# Patient Record
Sex: FEMALE | Race: WHITE | Employment: FULL TIME | URBAN - METROPOLITAN AREA
[De-identification: names, ages, dates, MRNs, and addresses within clinical notes are randomized per-mention and may not be internally consistent; named-entity substitution may affect disease eponyms.]

---

## 2020-11-10 ENCOUNTER — OFFICE VISIT (OUTPATIENT)
Dept: RHEUMATOLOGY | Age: 25
End: 2020-11-10
Payer: COMMERCIAL

## 2020-11-10 VITALS
BODY MASS INDEX: 24.07 KG/M2 | HEIGHT: 64 IN | TEMPERATURE: 98 F | DIASTOLIC BLOOD PRESSURE: 80 MMHG | HEART RATE: 96 BPM | SYSTOLIC BLOOD PRESSURE: 123 MMHG | OXYGEN SATURATION: 97 % | RESPIRATION RATE: 16 BRPM | WEIGHT: 141 LBS

## 2020-11-10 DIAGNOSIS — Z79.899 ONGOING USE OF POSSIBLY TOXIC MEDICATION: ICD-10-CM

## 2020-11-10 DIAGNOSIS — L94.0 MORPHEA: Primary | ICD-10-CM

## 2020-11-10 PROCEDURE — 99205 OFFICE O/P NEW HI 60 MIN: CPT | Performed by: INTERNAL MEDICINE

## 2020-11-10 RX ORDER — HYDROXYCHLOROQUINE SULFATE 200 MG/1
400 TABLET, FILM COATED ORAL DAILY
Qty: 180 TAB | Refills: 1 | Status: SHIPPED | OUTPATIENT
Start: 2020-11-10 | End: 2021-06-01 | Stop reason: SDUPTHER

## 2020-11-10 RX ORDER — CLOBETASOL PROPIONATE 0.5 MG/G
CREAM TOPICAL 2 TIMES DAILY
Qty: 60 G | Refills: 1 | Status: SHIPPED | OUTPATIENT
Start: 2020-11-10 | End: 2022-03-28

## 2020-11-10 NOTE — PROGRESS NOTES
REASON FOR VISIT:    is a 30 y.o. female with history of morphea who is self-referred to [address] [place] [address] Rheumatology to reestablish for the same. HISTORY OF PRESENT ILLNESS    Diagnosed within 10th grade with scleroderma, after punch biopsies of trunk lesions by two dermatologists. Referred to Rheumatology who diagnosed localized scleroderma (morphea). Treated with IV methylprednisolone infusions monthly as well as weekly subQ methotrexate. Estimates she has been on subQ methotrexate for 6 of the last 12 years. Last treatment June 2017. Was extremely nauseous with methotrexate. Switched to mycophenolate (1000mg bid) which she tolerated better with only minor sleep disturbance. Took this for 7-8 months, stopped around mid-2018. After about a year and half off of treatment, then in May started noticing new lesions, now about the size of a tennis ball from the size of a fingernail initially. Hasn't ever treated with topical treatments. 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 No change in voice since the diagnosis. No genesis GERD symptoms. No globus sensation. Feels she bloats easily after eating. No genesis constipation. Never bloody stool. Mother has been given multiple autoimmune diagnoses including Crohn's disease, ankylosing spondylitis, and rheumatoid arthritis. 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 Hands and feet always feel cold in the winter, though don't appreciably change color in the cold; does get painful flushing and erythema with hot water or rewarming. While doing gymnastics she felt she would get disproportionately dyspneic. Went to pulmonologist and completed exercise testing, doesn't recall the results but no medications started for this. No chronic cough. She gets pain from low back down. Hips. Feels deep constant ache throughout legs. No extended morning stiffness. \"Feels like a deep muscle ache. \"    Feels sun sensitive, gets \"splotchy\" while she is in the sun, fades over an hour or two. When younger would look more like hives. REVIEW OF SYSTEMS  Negatives as follows:  Regla Chaparroving:    Denies unexplained persistent fevers, weight change, chronic fatigue  HEAD/EYES:              Denies eye redness, blurry vision or sudden loss of vision, dry eyes, HA, temporal pain  ENT:                            Denies oral/nasal ulcers, recurrent sinus infections, dry mouth  RESPIRATORY:         No pleuritic pain, history of pleural effusions, hemoptysis, exertional dyspnea  CARDIOVASCULAR:             Denies chest pain, history of pericardial effusions  GASTRO:                    Denies heartburn, esophageal dysmotility, abdominal pain, nausea, vomiting, diarrhea, blood in the stool  HEMATOLOGIC:        No easy bruising, purpura, swollen lymph nodes  SKIN:                           Denies alopecia, ulcers, nodules, sun sensitivity, unexplained persistent rash   VASCULAR:                Denies edema, cyanosis, raynaud phenomenon  NEUROLOGIC:           Denies specific muscle weakness, paresthesias   PSYCHIATRIC:           No sleep disturbance / snoring, depression, anxiety  MSK:                           No morning stiffness >=1 hour, SI joint pain, persistent joint swelling, persistent joint pain    Review of systems is as above and is otherwise negative. ALLERGIES  Amoxicillin    MEDICATIONS  None current    PAST MEDICAL HISTORY  Significant only for morphea as per HPI    FAMILY HISTORY  family history includes Arthritis-rheumatoid in her mother; Crohn's Disease in her mother; Thyroid Disease in her mother. Mother has had iritis in the past. Younger brother has vitiligo. SOCIAL HISTORY  She  reports that she has never smoked. She has never used smokeless tobacco. She reports current alcohol use. She reports that she does not use drugs.   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 DATA  Visit Vitals  /80 (BP 1 Location: Left arm, BP Patient Position: Sitting)   Pulse 96   Temp 98 °F (36.7 °C) (Oral)   Resp 16   Ht 5' 4\" (1.626 m)   Wt 141 lb (64 kg)   LMP 10/27/2020   SpO2 97%   BMI 24.20 kg/m²    Body mass index is 24.2 kg/m². No flowsheet data found. General:  The patient is well developed, well nourished, alert, and in no apparent distress. Eyes: Sclera are anicteric. No conjunctival injection. HEENT:  Oropharynx is clear. No oral ulcers. Adequate salivary pooling. No cervical or supraclavicular lymphadenopathy. Lungs:  Clear to auscultation bilaterally, without wheeze or stridor. Normal respiratory effort. Cor:  Regular rate and rhythm. No murmur rub or gallop. Abdomen: Soft, non-tender, without hepatomegaly or masses. Extremities: No calf tenderness or edema. Warm and well perfused. Skin:  Numerous hyperpigmented postinflammatory lesions across abdomen and back particularly along lower bra line and lateral beltline, ~5cm diameter edematous patch under left breast, 1cm diameter more erythematous patch. No sclerodactyly. Normal nailfold capillaries. Neuro: No foot or wrist drop. Normal gait. Musculoskeletal:    A comprehensive musculoskeletal exam was performed for all joints of each upper and lower extremity and assessed for swelling, tenderness and range of motion. Results are documented as below:  No evidence of synovitis in the small joints of the hands, wrists, shoulders, elbows, hips, knees or ankles. Labs:  11/3/15: CBC WNL  12/8/08: Tbili 0.4, AST 22, ALT 14, AlkP 259. Imaging:  None available      ASSESSMENT AND PLAN  Ms. Eriberto Phipps is a 30 y.o. female who presents for evaluation of morphea.  She has had good earlier responses to methotrexate and then mycophenolate, but with localized breakthrough disease will first try topical clobetasol and background Plaquenil (hydroxychloroquine) to help suppress further flares. Reviewed risks of Plaquenil (hydroxychloroquine) and the need for annual ophthalmology exams. Reassured her of low suspicion for systemic sclerosis, though will update deep organ function and screen LOURDES. 1. Morphea  - IMMUNOGLOBULINS, G/A/M, QT.; Future  - ANTI-NUCLEAR AB BY IFA (RDL); Future  - C REACTIVE PROTEIN, QT; Future  - CBC WITH AUTOMATED DIFF; Future  - METABOLIC PANEL, COMPREHENSIVE; Future  - SED RATE (ESR); Future  - hydrOXYchloroQUINE (PLAQUENIL) 200 mg tablet; Take 2 Tabs by mouth daily. Dispense: 180 Tab; Refill: 1  - clobetasoL (TEMOVATE) 0.05 % topical cream; Apply  to affected area two (2) times a day. Dispense: 60 g; Refill: 1    2. Ongoing use of possibly toxic medication  - ANTI-NUCLEAR AB BY IFA (RDL); Future  - CBC WITH AUTOMATED DIFF; Future  - hydrOXYchloroQUINE (PLAQUENIL) 200 mg tablet; Take 2 Tabs by mouth daily. Dispense: 180 Tab; Refill: 1      Orders Placed This Encounter    IMMUNOGLOBULINS, G/A/M, QT.    ANTI-NUCLEAR AB BY IFA (RDL)    C REACTIVE PROTEIN, QT    CBC WITH AUTOMATED DIFF    METABOLIC PANEL, COMPREHENSIVE    SED RATE (ESR)    hydrOXYchloroQUINE (PLAQUENIL) 200 mg tablet    clobetasoL (TEMOVATE) 0.05 % topical cream     Patient Instructions   1. Your skin lesions are typical for morphea. I don't see any evidence of systemic sclerosis, though we'll screen for blood work predisposing you to problems. 2. Start taking Plaquenil one pill twice daily. Find an ophthalmologist to perform annual eye screenings for retinal toxicity monitoring. 3. I'm prescribing clobetasol cream to apply to the newer active lesions. Apply clobetasol twice a day. If you don't see any substantial improvement within a month, let me know so we don't leave you on this indefinitely. 4. Talk to someone in your office to help you manage topical treatments--dermatologists often do injections for this.     5. Return in 6 months, or message with any issues in the meantime. . Mychart's great, you can attach pictures to messages through this too which is helpful. Medications: I am having Alvaro Meraz start on hydrOXYchloroQUINE and clobetasoL. Follow up: 6 months, or sooner as needed for breakthrough disease    60- minutes were spent in-person this visit, of which greater than 50% of the visit was spent counseling the patient on diagnosis, differential, and management of morphea.     Agustina Sears MD    Adult Rheumatology   [facility]  [facility], [address] [facility]   Phone [phone]  Fax [phone]

## 2020-11-10 NOTE — PATIENT INSTRUCTIONS
1. Your skin lesions are typical for morphea. I don't see any evidence of systemic sclerosis, though we'll screen for blood work predisposing you to problems. 2. Start taking Plaquenil one pill twice daily. Find an ophthalmologist to perform annual eye screenings for retinal toxicity monitoring. 3. I'm prescribing clobetasol cream to apply to the newer active lesions. Apply clobetasol twice a day. If you don't see any substantial improvement within a month, let me know so we don't leave you on this indefinitely. 4. Talk to someone in your office to help you manage topical treatments--dermatologists often do injections for this. 5. Return in 6 months, or message with any issues in the meantime. . Mychart's great, you can attach pictures to messages through this too which is helpful.

## 2020-11-23 LAB
ALBUMIN SERPL-MCNC: 5 G/DL (ref 3.9–5)
ALBUMIN/GLOB SERPL: 2.3 {RATIO} (ref 1.2–2.2)
ALP SERPL-CCNC: 77 IU/L (ref 39–117)
ALT SERPL-CCNC: 12 IU/L (ref 0–32)
ANA HOMOGEN TITR SER: ABNORMAL {TITER}
ANA SER QL IF: POSITIVE
ANA SPECKLED TITR SER: ABNORMAL {TITER}
AST SERPL-CCNC: 19 IU/L (ref 0–40)
BASOPHILS # BLD AUTO: 0.1 X10E3/UL (ref 0–0.2)
BASOPHILS NFR BLD AUTO: 1 %
BILIRUB SERPL-MCNC: 0.4 MG/DL (ref 0–1.2)
BUN SERPL-MCNC: 8 MG/DL (ref 6–20)
BUN/CREAT SERPL: 10 (ref 9–23)
CALCIUM SERPL-MCNC: 10.1 MG/DL (ref 8.7–10.2)
CHLORIDE SERPL-SCNC: 101 MMOL/L (ref 96–106)
CO2 SERPL-SCNC: 24 MMOL/L (ref 20–29)
CREAT SERPL-MCNC: 0.82 MG/DL (ref 0.57–1)
CRP SERPL-MCNC: 2 MG/L (ref 0–10)
EOSINOPHIL # BLD AUTO: 0.1 X10E3/UL (ref 0–0.4)
EOSINOPHIL NFR BLD AUTO: 1 %
ERYTHROCYTE [DISTWIDTH] IN BLOOD BY AUTOMATED COUNT: 12 % (ref 11.7–15.4)
ERYTHROCYTE [SEDIMENTATION RATE] IN BLOOD BY WESTERGREN METHOD: 9 MM/HR (ref 0–32)
GLOBULIN SER CALC-MCNC: 2.2 G/DL (ref 1.5–4.5)
GLUCOSE SERPL-MCNC: 75 MG/DL (ref 65–99)
HCT VFR BLD AUTO: 37.4 % (ref 34–46.6)
HGB BLD-MCNC: 13 G/DL (ref 11.1–15.9)
IGA SERPL-MCNC: 204 MG/DL (ref 87–352)
IGG SERPL-MCNC: 1143 MG/DL (ref 586–1602)
IGM SERPL-MCNC: 88 MG/DL (ref 26–217)
IMM GRANULOCYTES # BLD AUTO: 0 X10E3/UL (ref 0–0.1)
IMM GRANULOCYTES NFR BLD AUTO: 0 %
LYMPHOCYTES # BLD AUTO: 2.8 X10E3/UL (ref 0.7–3.1)
LYMPHOCYTES NFR BLD AUTO: 31 %
MCH RBC QN AUTO: 31.1 PG (ref 26.6–33)
MCHC RBC AUTO-ENTMCNC: 34.8 G/DL (ref 31.5–35.7)
MCV RBC AUTO: 90 FL (ref 79–97)
MONOCYTES # BLD AUTO: 0.6 X10E3/UL (ref 0.1–0.9)
MONOCYTES NFR BLD AUTO: 7 %
NEUTROPHILS # BLD AUTO: 5.3 X10E3/UL (ref 1.4–7)
NEUTROPHILS NFR BLD AUTO: 60 %
NOTE, 018286: ABNORMAL
PLATELET # BLD AUTO: 305 X10E3/UL (ref 150–450)
POTASSIUM SERPL-SCNC: 4 MMOL/L (ref 3.5–5.2)
PROT SERPL-MCNC: 7.2 G/DL (ref 6–8.5)
RBC # BLD AUTO: 4.18 X10E6/UL (ref 3.77–5.28)
SODIUM SERPL-SCNC: 140 MMOL/L (ref 134–144)
WBC # BLD AUTO: 8.8 X10E3/UL (ref 3.4–10.8)

## 2021-05-03 LAB
ANTIBODY SCREEN, EXTERNAL: NEGATIVE
HBSAG, EXTERNAL: NEGATIVE
HEPATITIS C AB,   EXT: NEGATIVE
HIV, EXTERNAL: NON REACTIVE
RUBELLA, EXTERNAL: NORMAL
T. PALLIDUM, EXTERNAL: NON REACTIVE
TYPE, ABO & RH, EXTERNAL: NORMAL

## 2021-06-01 ENCOUNTER — OFFICE VISIT (OUTPATIENT)
Dept: RHEUMATOLOGY | Age: 26
End: 2021-06-01
Payer: COMMERCIAL

## 2021-06-01 VITALS
WEIGHT: 136.6 LBS | RESPIRATION RATE: 18 BRPM | HEART RATE: 100 BPM | BODY MASS INDEX: 23.32 KG/M2 | SYSTOLIC BLOOD PRESSURE: 115 MMHG | DIASTOLIC BLOOD PRESSURE: 73 MMHG | TEMPERATURE: 98.8 F | HEIGHT: 64 IN | OXYGEN SATURATION: 98 %

## 2021-06-01 DIAGNOSIS — L94.0 MORPHEA: Primary | ICD-10-CM

## 2021-06-01 DIAGNOSIS — Z79.899 ONGOING USE OF POSSIBLY TOXIC MEDICATION: ICD-10-CM

## 2021-06-01 PROCEDURE — 99215 OFFICE O/P EST HI 40 MIN: CPT | Performed by: INTERNAL MEDICINE

## 2021-06-01 RX ORDER — HYDROXYCHLOROQUINE SULFATE 200 MG/1
400 TABLET, FILM COATED ORAL DAILY
Qty: 180 TABLET | Refills: 3 | Status: SHIPPED | OUTPATIENT
Start: 2021-06-01 | End: 2022-03-24 | Stop reason: SDUPTHER

## 2021-06-01 RX ORDER — GUAIFENESIN 100 MG/5ML
81 LIQUID (ML) ORAL DAILY
COMMUNITY
End: 2022-03-28

## 2021-06-01 RX ORDER — ONDANSETRON 4 MG/1
4 TABLET, FILM COATED ORAL
COMMUNITY

## 2021-06-01 NOTE — PATIENT INSTRUCTIONS
1. Labs at your convenience in the next couple of weeks. 2. If your lesions get worse let me know, we may be able to use triamcinolone or a weaker steroid. 3. Continue Plaquenil (hydroxychloroquine). 4. Return in 6-9 months.

## 2021-06-01 NOTE — PROGRESS NOTES
Chief Complaint   Patient presents with    Follow-up     1. Have you been to the ER, urgent care clinic since your last visit? Hospitalized since your last visit? No    2. Have you seen or consulted any other health care providers outside of the [address] [place] [address] since your last visit? Include any pap smears or colon screening.  Yes Where: OB/[facility]N at [facility]

## 2021-06-01 NOTE — PROGRESS NOTES
REASON FOR VISIT:    is a 30 y.o. female with history of morphea who is returning for initial followup. HISTORY OF PRESENT ILLNESS    Tolerating Plaquenil (hydroxychloroquine) well, as long as she eats before she takes it. Consolidated now to 400mg once daily. Two new small lesions, left abdominal lesion slightly larger, periumbilical is similar. Saw ophthalmology for VF baseline screen which was normal.    No breathing or digestive complaints. Pregnant now, due December 7 (13wk now), saw ob/gyn last Friday. Advised to stop using clobetasol cream. Advised to start aspirin 81mg daily, though says she has never had a miscarriage. Taking zofran about twice a day for the last 8 weeks for morning sickness. Disease History:  Diagnosed in 10th grade with scleroderma, after punch biopsies of trunk lesions by two dermatologists. Referred to Rheumatology who diagnosed localized scleroderma (morphea). Treated with IV methylprednisolone infusions monthly as well as weekly subQ methotrexate. Estimates she has been on subQ methotrexate for 6 of the last 12 years. Last treatment June 2017. Was extremely nauseous with methotrexate. Switched to mycophenolate (1000mg bid) which she tolerated better with only minor sleep disturbance. Took this for 7-8 months, stopped around mid-2018. After about a year and half off of treatment, then in May started noticing new lesions, now about the size of a tennis ball from the size of a fingernail initially. Hasn't ever treated with topical treatments. 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 No change in voice since the diagnosis. No genesis GERD symptoms. No globus sensation. Feels she bloats easily after eating. No genesis constipation. Never bloody stool. Mother has been given multiple autoimmune diagnoses including Crohn's disease, ankylosing spondylitis, and rheumatoid arthritis.  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 Hands and feet always feel cold in the winter, though don't appreciably change color in the cold; does get painful flushing and erythema with hot water or rewarming. While doing gymnastics she felt she would get disproportionately dyspneic. Went to pulmonologist and completed exercise testing, doesn't recall the results but no medications started for this. No chronic cough. She gets pain from low back down. Hips. Feels deep constant ache throughout legs. No extended morning stiffness. \"Feels like a deep muscle ache. \"    Feels sun sensitive, gets \"splotchy\" while she is in the sun, fades over an hour or two. When younger would look more like hives.       REVIEW OF SYSTEMS  Negatives as follows:  Jacey Sesay:    Denies unexplained persistent fevers, weight change, chronic fatigue  HEAD/EYES:              Denies eye redness, blurry vision or sudden loss of vision, dry eyes, HA, temporal pain  ENT:                            Denies oral/nasal ulcers, recurrent sinus infections, dry mouth  RESPIRATORY:         No pleuritic pain, history of pleural effusions, hemoptysis, exertional dyspnea  CARDIOVASCULAR:             Denies chest pain, history of pericardial effusions  GASTRO:                    Denies heartburn, esophageal dysmotility, abdominal pain, nausea, vomiting, diarrhea, blood in the stool  HEMATOLOGIC:        No easy bruising, purpura, swollen lymph nodes  SKIN:                           Denies alopecia, ulcers, nodules, sun sensitivity, unexplained persistent rash   VASCULAR:                Denies edema, cyanosis, raynaud phenomenon  NEUROLOGIC:           Denies specific muscle weakness, paresthesias   PSYCHIATRIC:           No sleep disturbance / snoring, depression, anxiety  MSK:                           No morning stiffness >=1 hour, SI joint pain, persistent joint swelling, persistent joint pain    Review of systems is as above and is otherwise negative. ALLERGIES  Amoxicillin    MEDICATIONS  None current    PAST MEDICAL HISTORY  Significant only for morphea as per HPI    FAMILY HISTORY  family history includes Arthritis-rheumatoid in her mother; Crohn's Disease in her mother; Thyroid Disease in her mother. Mother has had iritis in the past. Younger brother has vitiligo. SOCIAL HISTORY  She  reports that she has never smoked. She has never used smokeless tobacco. She reports current alcohol use. She reports that she does not use drugs. 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 No flowsheet data found. General:  The patient is well developed, well nourished, alert, and in no apparent distress. Eyes: Sclera are anicteric. No conjunctival injection. HEENT:  Oropharynx is clear. No oral ulcers. Adequate salivary pooling. No cervical or supraclavicular lymphadenopathy. Lungs:  Clear to auscultation bilaterally, without wheeze or stridor. Normal respiratory effort. Cor:  Regular rate and rhythm. No murmur rub or gallop. Abdomen: Soft, non-tender, without hepatomegaly or masses. Extremities: No calf tenderness or edema. Warm and well perfused. Skin:  Numerous stable hyperpigmented postinflammatory lesions across abdomen and back particularly along lower bra line and lateral beltline, Grossly stable ~5cm diameter edematous patch under left breast, 1cm diameter more erythematous patch under right breast. Still no sclerodactyly. Normal nailfold capillaries. Neuro: No foot or wrist drop. Normal gait.   Musculoskeletal:    A comprehensive musculoskeletal exam was performed for all joints of each upper and lower extremity and assessed for swelling, tenderness and range of motion. Results are documented as below:  No evidence of synovitis in the small joints of the hands, wrists, shoulders, elbows, hips, knees or ankles. Labs:  11/20/20: CBC WNL, CMP WNL (Cr 0.82), LOURDES 1:160 homogenous, ESR 9, CRP 2mg/L  11/3/15: CBC WNL  12/8/08: Tbili 0.4, AST 22, ALT 14, AlkP 259. Imaging:  None available      ASSESSMENT AND PLAN  Ms. Vivien Kay is a 30 y.o. female who presents for evaluation of morphea. She has had good earlier responses to methotrexate and then mycophenolate, tolerating Plaquenil (hydroxychloroquine) well now entering the second trimester of pregnancy. Will continue to avoid clobetasol though could entertain triamcinolone cream if lesions worsen, with blessing of her ob/gyn. Low-titer LOURDES of doubtful clinical significance, sending SSA/SSB per ob/gyn and will complete scleroderma workup with scl70 and RNApol3 antibodies as well (though typically not homogenous pattern LOURDES). 1. Morphea  - SJOGREN'S ABS, SSA AND SSB; Future  - RNA POLYMERASE-III ANTIBODY, IGG; Future  - SCLERODERMA (SCL-70) AB, IGG; Future  - CBC WITH AUTOMATED DIFF; Future  - METABOLIC PANEL, COMPREHENSIVE; Future  - URINALYSIS W/MICROSCOPIC; Future  - PROT+CREATU (RANDOM); Future  - SJOGREN'S ABS, SSA AND SSB  - RNA POLYMERASE-III ANTIBODY, IGG  - SCLERODERMA (SCL-70) AB, IGG  - CBC WITH AUTOMATED DIFF  - METABOLIC PANEL, COMPREHENSIVE  - URINALYSIS W/MICROSCOPIC  - PROT+CREATU (RANDOM)  - hydrOXYchloroQUINE (PLAQUENIL) 200 mg tablet; Take 2 Tablets by mouth daily. Dispense: 180 Tablet; Refill: 3    2. Ongoing use of possibly toxic medication  - hydrOXYchloroQUINE (PLAQUENIL) 200 mg tablet; Take 2 Tablets by mouth daily. Dispense: 180 Tablet;  Refill: 3        Orders Placed This Encounter    SJOGREN'S ABS, SSA AND SSB    RNA POLYMERASE-III ANTIBODY, IGG    SCLERODERMA (SCL-70) AB, IGG    CBC WITH AUTOMATED DIFF    METABOLIC PANEL, COMPREHENSIVE    URINALYSIS W/MICROSCOPIC    PROT+CREATU (RANDOM)    aspirin 81 mg chewable tablet    vit B Cmplx 3-FA-Vit C-Biotin (NEPHRO YADIRA RX) 1- mg-mg-mcg tablet    ondansetron hcl (Zofran) 4 mg tablet    prenatal 36/UZHZ fum/folic/dha (PRENATAL-1 PO)    hydrOXYchloroQUINE (PLAQUENIL) 200 mg tablet     Patient Instructions   1. Labs at your convenience in the next couple of weeks. 2. If your lesions get worse let me know, we may be able to use triamcinolone or a weaker steroid. 3. Continue Plaquenil (hydroxychloroquine). 4. Return in 6-9 months. Medications: I have changed Radha Meraz's hydrOXYchloroQUINE. I am also having her maintain her clobetasoL, aspirin, vit B Cmplx 3-FA-Vit C-Biotin, ondansetron hcl, and prenatal 88/NWFP fum/folic/dha (PRENATAL-1 PO).     Follow up: 6-9 months, or sooner as needed for breakthrough disease    Face to face time: 20 minutes  Note preparation and records review day of service: 20 minutes  Total provider time day of service: 40 minutes      Tara Rodríguez MD    Adult Rheumatology   [address] [facility] [address]  [place], [address] [place] [address]   Phone [phone]  Fax [phone]

## 2021-06-01 NOTE — LETTER
6/1/2021 9:14 AM    Patient:  Chuy Larry   YOB: 1995  Date of Visit: 6/1/2021      Dear Rosy Aldrich MD   Kecia Fatima [address] [place] [id number]  Via Fax: [phone]: Thank you for referring Ms. Chuy Larry to me for evaluation/treatment. Below are the relevant portions of my assessment and plan of care. If you have questions, please do not hesitate to call me. I look forward to following Ms. Meraz along with you.         Sincerely,      Charito Mcfarlane MD  Cell: [phone]

## 2021-06-09 LAB
ALBUMIN SERPL-MCNC: 4.1 G/DL (ref 3.9–5)
ALBUMIN/GLOB SERPL: 1.5 {RATIO} (ref 1.2–2.2)
ALP SERPL-CCNC: 71 IU/L (ref 48–121)
ALT SERPL-CCNC: 12 IU/L (ref 0–32)
ANTI-RNA POLYMERASE III (RDL): <20 UNITS
APPEARANCE UR: CLEAR
AST SERPL-CCNC: 17 IU/L (ref 0–40)
BACTERIA #/AREA URNS HPF: NORMAL /[HPF]
BASOPHILS # BLD AUTO: 0 X10E3/UL (ref 0–0.2)
BASOPHILS NFR BLD AUTO: 0 %
BILIRUB SERPL-MCNC: 0.2 MG/DL (ref 0–1.2)
BILIRUB UR QL STRIP: NEGATIVE
BUN SERPL-MCNC: 6 MG/DL (ref 6–20)
BUN/CREAT SERPL: 13 (ref 9–23)
CALCIUM SERPL-MCNC: 9.5 MG/DL (ref 8.7–10.2)
CASTS URNS QL MICRO: NORMAL /LPF
CHLORIDE SERPL-SCNC: 99 MMOL/L (ref 96–106)
CO2 SERPL-SCNC: 21 MMOL/L (ref 20–29)
COLOR UR: YELLOW
CREAT SERPL-MCNC: 0.48 MG/DL (ref 0.57–1)
CREAT UR-MCNC: 21.3 MG/DL
ENA SCL70 AB SER-ACNC: <0.2 AI (ref 0–0.9)
ENA SS-A AB SER-ACNC: <0.2 AI (ref 0–0.9)
ENA SS-B AB SER-ACNC: <0.2 AI (ref 0–0.9)
EOSINOPHIL # BLD AUTO: 0 X10E3/UL (ref 0–0.4)
EOSINOPHIL NFR BLD AUTO: 0 %
EPI CELLS #/AREA URNS HPF: NORMAL /HPF (ref 0–10)
ERYTHROCYTE [DISTWIDTH] IN BLOOD BY AUTOMATED COUNT: 12.4 % (ref 11.7–15.4)
GLOBULIN SER CALC-MCNC: 2.7 G/DL (ref 1.5–4.5)
GLUCOSE SERPL-MCNC: 95 MG/DL (ref 65–99)
GLUCOSE UR QL: NEGATIVE
HCT VFR BLD AUTO: 36.5 % (ref 34–46.6)
HGB BLD-MCNC: 12.7 G/DL (ref 11.1–15.9)
HGB UR QL STRIP: NEGATIVE
IMM GRANULOCYTES # BLD AUTO: 0.1 X10E3/UL (ref 0–0.1)
IMM GRANULOCYTES NFR BLD AUTO: 1 %
KETONES UR QL STRIP: NEGATIVE
LEUKOCYTE ESTERASE UR QL STRIP: NEGATIVE
LYMPHOCYTES # BLD AUTO: 1.9 X10E3/UL (ref 0.7–3.1)
LYMPHOCYTES NFR BLD AUTO: 20 %
MCH RBC QN AUTO: 30.9 PG (ref 26.6–33)
MCHC RBC AUTO-ENTMCNC: 34.8 G/DL (ref 31.5–35.7)
MCV RBC AUTO: 89 FL (ref 79–97)
MICRO URNS: NORMAL
MICRO URNS: NORMAL
MONOCYTES # BLD AUTO: 0.7 X10E3/UL (ref 0.1–0.9)
MONOCYTES NFR BLD AUTO: 8 %
NEUTROPHILS # BLD AUTO: 6.7 X10E3/UL (ref 1.4–7)
NEUTROPHILS NFR BLD AUTO: 71 %
NITRITE UR QL STRIP: NEGATIVE
PH UR STRIP: 6.5 [PH] (ref 5–7.5)
PLATELET # BLD AUTO: 277 X10E3/UL (ref 150–450)
POTASSIUM SERPL-SCNC: 4.2 MMOL/L (ref 3.5–5.2)
PROT SERPL-MCNC: 6.8 G/DL (ref 6–8.5)
PROT UR QL STRIP: NEGATIVE
PROT UR-MCNC: <4 MG/DL
PROT/CREAT UR: ABNORMAL MG/G CREAT (ref 0–200)
RBC # BLD AUTO: 4.11 X10E6/UL (ref 3.77–5.28)
RBC #/AREA URNS HPF: NORMAL /HPF (ref 0–2)
SODIUM SERPL-SCNC: 136 MMOL/L (ref 134–144)
SP GR UR: 1.01 (ref 1–1.03)
UROBILINOGEN UR STRIP-MCNC: 0.2 MG/DL (ref 0.2–1)
WBC # BLD AUTO: 9.5 X10E3/UL (ref 3.4–10.8)
WBC #/AREA URNS HPF: NORMAL /HPF (ref 0–5)

## 2021-06-29 ENCOUNTER — TELEPHONE (OUTPATIENT)
Dept: RHEUMATOLOGY | Age: 26
End: 2021-06-29

## 2021-06-29 NOTE — TELEPHONE ENCOUNTER
----- Message from Ed Rollins sent at 6/29/2021  2:51 PM EDT -----  Regarding: Dr. Kyler Mg Message/Vendor Calls    Caller's first and last name:Laurence([facility]-ER)      Reason for call:lab results      Callback required yes/no and why:yes, if questions      Best contact number(s):[phone](fax)[phone]      Details to clarify the request:Laurence requested pts \"SSA and \"SSB\" lab results.       Ed Rollins

## 2021-06-29 NOTE — TELEPHONE ENCOUNTER
Call placed to [facility] x 3 to speak with Pito Root to clarify what is needed. Phone call got disconnected with each call.

## 2021-10-05 ENCOUNTER — TELEPHONE (OUTPATIENT)
Dept: RHEUMATOLOGY | Age: 26
End: 2021-10-05

## 2021-10-05 NOTE — TELEPHONE ENCOUNTER
Call patient per doc request left voice message to have patient to call back into the office to schedule an appt for her RPV within the next 1-2 weeks.  sdh

## 2021-11-12 LAB — GRBS, EXTERNAL: NEGATIVE

## 2021-11-29 ENCOUNTER — HOSPITAL ENCOUNTER (INPATIENT)
Age: 26
LOS: 3 days | Discharge: HOME | End: 2021-12-02
Attending: OBSTETRICS & GYNECOLOGY | Admitting: OBSTETRICS & GYNECOLOGY
Payer: COMMERCIAL

## 2021-11-29 PROBLEM — Z3A.39 39 WEEKS GESTATION OF PREGNANCY: Status: ACTIVE | Noted: 2021-11-29

## 2021-11-29 LAB
COVID-19 RAPID TEST, COVR: NOT DETECTED
ERYTHROCYTE [DISTWIDTH] IN BLOOD BY AUTOMATED COUNT: 12.8 % (ref 11.5–14.5)
HCT VFR BLD AUTO: 35 % (ref 35–47)
HGB BLD-MCNC: 11.7 G/DL (ref 11.5–16)
MCH RBC QN AUTO: 30.5 PG (ref 26–34)
MCHC RBC AUTO-ENTMCNC: 33.4 G/DL (ref 30–36.5)
MCV RBC AUTO: 91.4 FL (ref 80–99)
NRBC # BLD: 0 K/UL (ref 0–0.01)
NRBC BLD-RTO: 0 PER 100 WBC
PLATELET # BLD AUTO: 220 K/UL (ref 150–400)
PMV BLD AUTO: 10.5 FL (ref 8.9–12.9)
RBC # BLD AUTO: 3.83 M/UL (ref 3.8–5.2)
SARS-COV-2, COV2: NORMAL
SOURCE, COVRS: NORMAL
WBC # BLD AUTO: 13.6 K/UL (ref 3.6–11)

## 2021-11-29 PROCEDURE — 85027 COMPLETE CBC AUTOMATED: CPT

## 2021-11-29 PROCEDURE — 87635 SARS-COV-2 COVID-19 AMP PRB: CPT

## 2021-11-29 PROCEDURE — 74011250636 HC RX REV CODE- 250/636: Performed by: OBSTETRICS & GYNECOLOGY

## 2021-11-29 PROCEDURE — 65270000029 HC RM PRIVATE

## 2021-11-29 PROCEDURE — 36415 COLL VENOUS BLD VENIPUNCTURE: CPT

## 2021-11-29 PROCEDURE — 74011250637 HC RX REV CODE- 250/637: Performed by: OBSTETRICS & GYNECOLOGY

## 2021-11-29 RX ORDER — SODIUM CHLORIDE, SODIUM LACTATE, POTASSIUM CHLORIDE, CALCIUM CHLORIDE 600; 310; 30; 20 MG/100ML; MG/100ML; MG/100ML; MG/100ML
125 INJECTION, SOLUTION INTRAVENOUS CONTINUOUS
Status: DISCONTINUED | OUTPATIENT
Start: 2021-11-29 | End: 2021-12-02 | Stop reason: HOSPADM

## 2021-11-29 RX ORDER — OXYTOCIN/RINGER'S LACTATE 30/500 ML
87.3 PLASTIC BAG, INJECTION (ML) INTRAVENOUS AS NEEDED
Status: COMPLETED | OUTPATIENT
Start: 2021-11-29 | End: 2021-11-30

## 2021-11-29 RX ORDER — OXYTOCIN/RINGER'S LACTATE 30/500 ML
0-20 PLASTIC BAG, INJECTION (ML) INTRAVENOUS
Status: DISCONTINUED | OUTPATIENT
Start: 2021-11-30 | End: 2021-11-30 | Stop reason: HOSPADM

## 2021-11-29 RX ORDER — HYDROXYCHLOROQUINE SULFATE 200 MG/1
400 TABLET, FILM COATED ORAL
Status: DISCONTINUED | OUTPATIENT
Start: 2021-11-29 | End: 2021-11-30

## 2021-11-29 RX ORDER — SODIUM CHLORIDE 0.9 % (FLUSH) 0.9 %
5-40 SYRINGE (ML) INJECTION EVERY 8 HOURS
Status: DISCONTINUED | OUTPATIENT
Start: 2021-11-29 | End: 2021-11-30 | Stop reason: HOSPADM

## 2021-11-29 RX ORDER — OXYTOCIN/RINGER'S LACTATE 30/500 ML
10 PLASTIC BAG, INJECTION (ML) INTRAVENOUS AS NEEDED
Status: DISCONTINUED | OUTPATIENT
Start: 2021-11-29 | End: 2021-12-02 | Stop reason: HOSPADM

## 2021-11-29 RX ORDER — HYDROXYCHLOROQUINE SULFATE 200 MG/1
400 TABLET, FILM COATED ORAL
Status: DISCONTINUED | OUTPATIENT
Start: 2021-11-30 | End: 2021-11-29

## 2021-11-29 RX ORDER — ONDANSETRON 2 MG/ML
4 INJECTION INTRAMUSCULAR; INTRAVENOUS
Status: DISCONTINUED | OUTPATIENT
Start: 2021-11-29 | End: 2021-11-30 | Stop reason: HOSPADM

## 2021-11-29 RX ORDER — TERBUTALINE SULFATE 1 MG/ML
0.25 INJECTION SUBCUTANEOUS AS NEEDED
Status: DISCONTINUED | OUTPATIENT
Start: 2021-11-29 | End: 2021-11-30 | Stop reason: HOSPADM

## 2021-11-29 RX ORDER — BUTORPHANOL TARTRATE 1 MG/ML
2 INJECTION INTRAMUSCULAR; INTRAVENOUS
Status: DISCONTINUED | OUTPATIENT
Start: 2021-11-29 | End: 2021-11-30 | Stop reason: HOSPADM

## 2021-11-29 RX ORDER — SODIUM CHLORIDE 0.9 % (FLUSH) 0.9 %
5-40 SYRINGE (ML) INJECTION AS NEEDED
Status: DISCONTINUED | OUTPATIENT
Start: 2021-11-29 | End: 2021-11-30 | Stop reason: HOSPADM

## 2021-11-29 RX ORDER — HYDROXYCHLOROQUINE SULFATE 200 MG/1
400 TABLET, FILM COATED ORAL DAILY
COMMUNITY
End: 2022-03-28

## 2021-11-29 RX ADMIN — HYDROXYCHLOROQUINE SULFATE 400 MG: 200 TABLET ORAL at 21:39

## 2021-11-29 RX ADMIN — BUTORPHANOL TARTRATE 2 MG: 1 INJECTION, SOLUTION INTRAMUSCULAR; INTRAVENOUS at 18:58

## 2021-11-29 NOTE — H&P
[facility]  Martell Alfredo 30, [address]  Phone: [phone], Fax: [phone]  Date: 2021    Pregnancy Problems:   Reduced fetal movement   Autoimmune disease - working diagnosis of lupus from peds rheum; but adult rheum says NO; does have scleroderma - rheumatologist: Dr Lucian Bush - on hydroxychloroquine (takes this for Scleroderma) 2021 - labs, reports most recent LOURDES neg, SSA/SSB neg baby ASA *growth q4w (w MFM), weekly surv at [facility] (no MFM, unless concerns arise)____  - EFW 62%ile 9/15 - w - EFW 43%ile Per MFM- [address] [facility] [address] w induction   Rubella non-immune - vaccine PP   Hyperemesis gravidarum   Primigravida   Family history of intellectual disability   Family history of Congenital heart disease - MGF- valve too small    Pt reports her mom had severe PP fistula and multiple reconstructions (mom has h/o crohns) --> pt concerned - wants to discuss birth plan  PRN faxed to L&D 11/15/2021 - VM  21-6am [facility] L&D/IOL/Sarraf (21 4pm cook catheter/[place])  History of Present Illness:  Pt is a 28 yo G1 at 38+6 presenting for induction for hx scleroderma and possibly lupus although her current rheumatologist does not feel that she meets criteria. Pregnancy also complicated by Rubella non-immune status. Pt is GBS negative. EFW 11/10 20th percentile. Assessment/Plan  1. Gestation period, 38 weeks -  Admit to L&D. Stephens bulb for cervical ripening. Pitocin in AM.  GBS neg. EFM/[place].  AROM PRN. Epidural PRN. Anticipate .  Z3A.38: 38 weeks gestation of pregnancy    2. Autoimmune disease -  Scleroderma --> on Plaquinil 400 mg daily  SSA/SSB neg. EFW 20%    M35.9: Systemic involvement of connective tissue, unspecified    3.  Rubella non-immune -  MMR PP  Z78.9: Other specified health status      Return to Office     for Return OB at [facility] [facility] on or around 2021   for Ultrasound Testing at [facility] [facility] on or around 11/30/2021  Ameya Wong MD for Surgery at [facility] () on 11/30/2021 at 06:00 AM  Prenatal Flowsheet:  Fundus Pres FHR FM PLS Cervix Exam BP Wt Edema Glucose Protein Leukocytes Nitrite Ketones Blood   05/03/2021   8 wks 6 days                           184    98/72 143 lbs none         Comments: Viable IUP. First visit. Will check insur coverage for SMA, CF Discussed prenatal labs, carrier testing options, aneuploidy testing options, and reviewed prenatal packet/folder/deck system/nutrition. Environmental/work hazards reviewed. 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 Considering COVID vaccine. Comfortable waiting for 2nd trimester. LUPUS hx. Reports pediatric dx. Also scleroderma. Takes hydroxychloroquine. Rx by rheumatology. No longer seeing pediatric rheum. Will f/u labs and reports they are confirming if indeed lupus dx. Reviewed that mgmnt of this pregnancy will be high risk for this supervision. Needs MFM w/ FS @ 20wk for r/o heart block. Consider baby ASA. Pt reports her mom had severe PP fistula and multiple reconstructions (mom does have h/o crohns) --> pt concerned - wants to discuss birth plan - chart noted Hyperemesisis - on zofran. Pap w/ G/C NEG 12/2020 05/03/2021     [email or web address]                                        05/28/2021   12 wks 3 days   [email or web address]                         164    100/66 140 lbs none         Comments: NOB. CAT is reviewed. Aneuploidy/NTD testing is discussed. Thinking [id number]. Undecided on hospital. Taking Zofran daily for nausea, helpful but still nausea/vomiting. Eating snacks throughout the day but reports poor diet/food aversions. Drinking Pedialyte and water. Few HAs this week. Has not taken meds. Some abdominal cramping/constipation. Denies abnormal discharge or vaginal bleeding. 06/24/2021   16 wks 2 days   [email or web address]                         143    110/70 138 lbs none none trace       Comments: Still not having great luck with meals.  States she is eating consistently more but is concerned she is still not gaining weight. Nausea has subsided for the last week, only vomiting on 2 occasions in the last 10 days. Denies HAs, cramping, abnormal discharge or vaginal bleeding. Pt would like to get covid vaccine second trimester. [id number] normal, GIRL! Planning covid vaccine. 07/21/2021   20 wks 1 days   [email or web address]                         155 Yes   114/68 144 lbs none none neg       Comments: U/S prior, FS w/ normal ECHO. U/S in pre jacobs status. Ant plac, EFW 26th%, Normal growth. No previa. Normal AFV. Nausea improved. No emesis. Reports acute gastric pain post prandial. Little help w/ tums. Improves over time. Rx pepcid daily, stay upright after eating. Denies h/a or edema or RUQ pain. If pain persists, needs RUQ imaging for r/o gallbladder. Pt comfortable w/ this plan. 2nd trim care reviewed. Classes discussed. Desires epidural. F/u in 4w as scheduled. Continue baby ASA d/t risk factors. 07/27/2021   21 wks   [email or web address]                       22 cm  153 Yes   110/68 145 lbs none         Comments: Doing well, active FM! Nausea has improved. C/o constant exhaustion- could start iron if desired, but also will check Hgb at 28w. Planning to get covid vaccine over the weekend. 08/02/2021   21 wks 6 days   [email or web address]                       22 cm  156 Yes  0cm / 0% / -4 112/70 145 lbs          Comments: Rm 10a. Pt complains of cramp-like abdominal cramping, hot and cold flashes. Denies bleeding, abnormal discharge, headaches, nausea. Normal BM. No gu sx , Not over active this weekend. Pt found out this morning that a coworker tested pos for COVID-- pt has not had COVID vaccine. Pt does not report any symptoms.  Strongly encouraged covid testing and if neg get vaccinated. sse: thicker white d/c wt prep few hyphae udip 2+ leuks will rx w/ macrobid and send ucx, prec reviewed, increase hydration/pelvic rest , keep scheduled appt   08/20/2021   24 wks 3 days   [email or web address] 155 Yes   116/76 150 lbs none         Comments: US 8/18 w MFM - EFW 62%ile, Having increased swelling of feet and ankles for the past 1-2 weeks. Compression socks are helping. Denies n/v, bleeding or abnormal discharge. Severe sciatic nerve pain - PT offered and accepted. (has known labral tear) Covid vaccine reviewed w patient. ACOG's new statement that recommends vaccination for pregnant women, and the safety data that has been gathered since vaccine introduction, is reviewed with patient and it is recommended that she get the vaccine for her safety. Questions are answered and she plans to get it next week. 09/20/2021   28 wks 6 days   [email or web address]                        Vertex 139 Yes   112/62 156 lbs trace none neg       Comments: Doing well. U/S done 9/15: EFW 43%ile, growth w MFM, weekly surv at 32 (no MFM unless concerns arise) Glucola in range. Hgb stable. TdAP today COVID #1 done. Denies reactions or s/e. ([place]) scheduled again in 2w. Reports pedal edema end of day. Better w/ rest. BP stable. Otherwise doing well. Reviewed strategies to help support low back discomfort 3rd trim care and precautions advised. Will choose peds MD. F/u in 2w. Scheduled for U/S in 4w.   09/28/2021   30 wks   [email or web address]                       28 cm Vertex 144 Yes   110/72 158 lbs trace         Comments: Pt presents at routine visit with c/o lightheadedness and nausea d/t leg pain in one small spot,inner left thight. . denies headaches, bleeding, abnormal discharge, LOF, cramping. Good FM. C/o excruciating inner thigh left leg pain that started 2 days ago. Pain does not radiate anywhere and only her left foot is swollen. Pain is 8/10 when walking. Pt has had sciatic nerve pain before, but reports this does not feel like that. Denies any strenuous changes in activity. Pt had imaging at [address] done yesterday (Monday 9/27/21) to rule out blood clot.  After consulting with Dr. Alison Amado, will refer to pt's rheumatologist for further work up. recommending warm heat, daily aspirin and lidocaine patch at this time to help with immediate pain symptoms. 10/15/2021   32 wks 3 days   [email or web address]                         144 Yes   110/68 160 lbs trace none trace       Comments: C/o acid reflux, increased pelvic pressure, and swelling in left foot and ankle over past 4 wks. reports she has been wearing compression socks at work. pt had swelling in left leg 2 wks ago, for which she had a doppler that was negative for a DVT. had a very focal area of pain on left thigh. Exam today w B symmetric trace edema, no erythema. Urgent care sent her to ortho, saw PA who said labral tear (which is known) is not the cause, advised crutches for a week and pain resolved. Foot still swelling at times, and feels bruised- rec follow up w ortho. pubic symphysis pain - feels like it is falling apart, declines PT referral. denies nausea/vomiting, vaginal bleeding, abnl discharge, headaches, vision changes, pelvic pain. 10/21/2021   33 wks 2 days   [email or web address]                         142 Yes   112/70 160 lbs trace         Comments: C/o pubic symphysis pain, swelling in feet, and acid reflux that is worse in the morning and in evenings. states she has reached out to friends who are in PT for exercise suggestions, pt denies any improvement in pubic symphysis pain yet. pt notes she cannot feasibly go to PT d/t time needed off work and cost at this time- plans in person visit if no response. reports foot soreness associated w swelling (not as severe as prior)- doppler neg. US after visit denies any pain in leg. denies nausea/vomiting, vaginal bleeding, abnl discharge, headaches, vision changes, LOF. 10/28/2021   34 wks 2 days   [email or web address]                         153 Yes   106/70 162 lbs          Comments: US prior BPP 8/8. no change in L foot swelling, resolves over night. Occ cramping/tightening last few days, constant, normal BMs- PTL precautions reviewed.    11/05/2021 35 wks 3 days   [email or web address]                          Yes   108/68 162 lbs trace         Comments: US after office visit. BPP 8/8 C/o pelvic pressure and pubic symphysis pain. notes PT exercises very mildly improve pubic pain. denies nausea/vomiting, vaginal bleeding, abnl discharge, headaches, vision changes, pelvic pain. 11/12/2021   36 wks 3 days   [email or web address]                        Vertex 146 Decreased  0cm / 30% / -3 106/74 163 lbs trace         Comments: GBS today. BPP 11/10 - 8/8, EFW 20%ile. NST today. decreased fetal movement yesterday. Notes she has not felt a significant amount of FM this morning yet. Reports she was nauseous, had hot flushes, and pelvic pressure that radiated to her back yesterday. notes leaning forward with her legs apart mildly provided some relief. denies vomiting, vaginal bleeding, abnl discharge, headaches, vision changes, pelvic pain, LOF. IOL recommendation reviewed by 39w.   11/17/2021   37 wks 1 days   [email or web address]                        Vertex 159 Yes  0cm / 0% / -3 100/68 162 lbs trace none neg       Comments: BPP prior 8/8 normal AFV. GBS neg. Complains of nausea and moderate cramping on Monday morning that woke her up, none since then. Great fetal movement. Denies Headaches, vomiting, Abnormal discharge, LOF, bleeding. Will schedule IOL at 39 weeks today. Cervix closed. STrict precautions reviewed. RTO x 1 week with BPP.   11/22/2021   37 wks 6 days   [email or web address]                       38 cm Vertex 138 Yes  1cm / 40% / -3 112/74 163 lbs trace none neg       Comments: BPP 8/8. VTX. Yesterday had episode of n/v/d. No fever. Called on-call MD. [facility]. Desires cervical check. Induction scheduled 11/30/2021. Coming in 11/29 for cx ripening. GBS NEG. Will d/c baby ASA this weekend. Denies HSV hx. Has not had COVID testing - handout provided Plans to breastfeed. Declines plans for [facility] at this time. 11/29/2021     [email or web address]                                        Allergies:   Allergies not reviewed (last reviewed 11/22/2021)     AMOXICILLIN: Hives (Moderate)    Medications:  Medications not reviewed (last reviewed 11/22/2021)     Baby Aspirin 81 mg chewable tablet  Chew 1 tablet(s) every day by oral route.  06/24/21   entered    hydrOXYchloroQUINE  400mg daily 05/03/21   entered    iron 08/20/21   entered    Prenatal 06/24/21   entered    Vital Signs:  None recorded  Problems:  Reviewed Problems     Abscess of vulva - Onset: 03/14/2016   Family history of breast cancer - Onset: 08/11/2015   Pregnant - Onset: 05/03/2021   Breast lump - Onset: 09/01/2017 11/30/21-6am [facility] L&D/IOL/[place] (11/29/21 4pm cook catheter/[place])  Past Medical History  Genetic / Hereditary Disorder , family history of Congenital heart disease MGF- valve too small   Immunologic Disorder Y, scleroderma managed for lupus since age 11, recently states told possibly not lupus    Pulmonary / Lung Disease , Restrictive lung disease-has SOB with exertion-no flare since 2013      Family History:  Discussed Family History    Maternal Aunt - Malignant tumor of breast  - onset 46s   Mother - Malignant neoplasm of skin     - Disorder of thyroid gland  - hashimotos     - Arthritis  - RA     - Crohn's disease  - spondylosis     - Fistula   Brother - Vitiligo     - Developmental academic disorder  - dyslexia and dyscrasia   Maternal Grandfather - Congenital heart disease  - several MI's   Social History:  Discussed Social History    Substance Use  Do you or have you ever smoked tobacco?: Never smoker  Do you or have you ever used e-cigarettes or vape?: Never used electronic cigarettes  Do you or have you ever used smokeless tobacco?: Never used smokeless tobacco  How much tobacco do you chew?: none  What was the date of your most recent tobacco screening?: 10/12/2018  Education and Occupation  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 Constitutional:General Appearance: no acute distress. Mental Status Findings oriented to time, place, and person and appropriate mood. Head:Head: normocephalic. Respiratory:Lungs unlabored respiratory effort. Abdomen:Inspection and Palpation: gravid abdomen. Female :Cervix: as documented in prenatal flowsheet. Extremities:Extremities: no edema. Skin:General Skin no rash or suspicious lesions and normal general appearance. OB Labs    Result Value Ref.  Range Date Collected Date Reviewed Entered By Note   Initial Labs             Blood Type A  05/03/2021 05/05/2021 [id number]        D (Rh) Type Positive  05/03/2021 05/05/2021 [id number]        Antibody Screen Negative NEGATIVE 05/03/2021 05/05/2021 [id number]        Antibody Screen Negative NEGATIVE 09/15/2021 09/17/2021 [email or web address]        HCT - Initial 36.4 % 34.0-46.6 05/03/2021 05/05/2021 [email or web address]        HGB - Initial 12.5 g/dL 11.1-15.9 05/03/2021 05/05/2021 [email or web address]        MCV - Initial 90 fL 79-97 05/03/2021 05/05/2021 [email or web address]        PLT - Initial 273 x10E3/uL 150-450 05/03/2021 05/05/2021 [email or web address]        VDRL - Initial Non Reactive NON REACTIVE 05/03/2021 05/05/2021 [email or web address]        Urine Culture/Screen Lactobacillus species  05/03/2021 05/07/2021 [email or web address]        Urine Culture/Screen NO GROWTH NO GROWTH 08/02/2021 08/04/2021 [email or web address]        HBsAg Negative NEGATIVE 05/03/2021 05/05/2021 [email or web address]        HIV Counseling/Testing Non Reactive NON REACTIVE 05/03/2021 05/05/2021 [email or web address]        Chlamydia - Initial Gonorrhea - Initial             Varicella             Rubella <0.90 index IMMUNE >0.99 05/03/2021 05/05/2021 [email or web address]    Optional Labs             HGB Electrophoresis             PPD/Quanta             Pap Test             Cystic Fibrosis             HPV             Henry-Sachs             Familial Dysautonomia             Genetic Screening Tests             NST             TSH   05/03/2021 05/05/2021 [id number]        Drug screen             HCV Ab   05/03/2021 05/05/2021 [email or web address]        HCV RNA             Urinalysis             Rhogam Injection         8-20 Week Labs             Ultrasound - Initial             1st Trimester Aneuploidy Risk Assessment             MSAFP/Multiple Markers   06/24/2021 06/28/2021 [email or web address]        2nd Trimester Serum Screening             Amnio/CVS             Karyotype             Amniotic Fluid (AFP)         24-28 Week Labs             HCT - 24-28 Weeks 33.9 % 34.0-46.6 09/15/2021 09/17/2021 [email or web address]        HGB - 24-28 Weeks 11.6 g/dL 11.1-15.9 09/15/2021 09/17/2021 [email or web address]        MCV - 24-28 Weeks             PLT - 24-28 Weeks 233 x10E3/uL 150-450 09/15/2021 09/17/2021 [email or web address]        Diabetes Screen 94 mg/dL  09/15/2021 09/17/2021 [email or web address]        GTT (If Screen Abnormal)             D (Rh) Antibody Screen         32-36 Week Labs             HCT - 32-36 Weeks             HGB - 32-36 Weeks             MCV - 32-36 Weeks             PLT - 32-36 Weeks             Ultrasound - 32-36 Weeks             HIV (When Indicated)             VDRL - 32-36 Weeks             Gonorrhea - 32-36 Weeks             Chlamydia - 32-36 Weeks             Depression screening (when indicated)         35-37 Week Labs             Group B Strep Negative Negative 11/12/2021 11/15/2021 [email or web address]        Resistance testing if penicillin allergic         Other Labs             Other         Vaccines  Reviewed Vaccines    Vaccine Type Date Amt. Route Site [address] Lot # Mfr. Exp.   Date VIS VIS  Given Vaccinator   COVID-19   COVID-19, mRNA, LNP-S, PF, 100 mcg/0.5 mL dose (Moderna) 09/01/21      [place].                                                         Diphtheria, Tetanus, Pertussis   Tdap 09/20/21 0.5 mL Intramuscular Deltoid, Left  MR5RK RobotsLABine 09/21/23 Tdap VIS 08/06/2021 09/20/21 Vesta Mims                                                     Tdap 09/20/21

## 2021-11-29 NOTE — PROGRESS NOTES
1700 Pt arrived to L+D for IOL. Plan for abreu balloon followed by pitocin. FOB at bedside    1800 Abreu bulb placed by Dr Randy Reaves. 1900 Stadol 2 given for pain.

## 2021-11-30 ENCOUNTER — ANESTHESIA EVENT (OUTPATIENT)
Dept: LABOR AND DELIVERY | Age: 26
End: 2021-11-30
Payer: COMMERCIAL

## 2021-11-30 ENCOUNTER — ANESTHESIA (OUTPATIENT)
Dept: LABOR AND DELIVERY | Age: 26
End: 2021-11-30
Payer: COMMERCIAL

## 2021-11-30 PROCEDURE — 76060000078 HC EPIDURAL ANESTHESIA

## 2021-11-30 PROCEDURE — 00HU33Z INSERTION OF INFUSION DEVICE INTO SPINAL CANAL, PERCUTANEOUS APPROACH: ICD-10-PCS | Performed by: ANESTHESIOLOGY

## 2021-11-30 PROCEDURE — 74011000258 HC RX REV CODE- 258: Performed by: OBSTETRICS & GYNECOLOGY

## 2021-11-30 PROCEDURE — 74011250636 HC RX REV CODE- 250/636: Performed by: OBSTETRICS & GYNECOLOGY

## 2021-11-30 PROCEDURE — 65270000029 HC RM PRIVATE

## 2021-11-30 PROCEDURE — 75410000002 HC LABOR FEE PER 1 HR

## 2021-11-30 PROCEDURE — 75410000003 HC RECOV DEL/VAG/CSECN EA 0.5 HR

## 2021-11-30 PROCEDURE — 0KQM0ZZ REPAIR PERINEUM MUSCLE, OPEN APPROACH: ICD-10-PCS | Performed by: INTERNAL MEDICINE

## 2021-11-30 PROCEDURE — 74011250636 HC RX REV CODE- 250/636: Performed by: ANESTHESIOLOGY

## 2021-11-30 PROCEDURE — 74011000250 HC RX REV CODE- 250: Performed by: ANESTHESIOLOGY

## 2021-11-30 PROCEDURE — 77030014125 HC TY EPDRL BBMI -B: Performed by: ANESTHESIOLOGY

## 2021-11-30 PROCEDURE — 59200 INSERT CERVICAL DILATOR: CPT

## 2021-11-30 PROCEDURE — 74011250637 HC RX REV CODE- 250/637: Performed by: OBSTETRICS & GYNECOLOGY

## 2021-11-30 PROCEDURE — 75410000000 HC DELIVERY VAGINAL/SINGLE

## 2021-11-30 PROCEDURE — 0UQMXZZ REPAIR VULVA, EXTERNAL APPROACH: ICD-10-PCS | Performed by: INTERNAL MEDICINE

## 2021-11-30 PROCEDURE — 65410000002 HC RM PRIVATE OB

## 2021-11-30 PROCEDURE — 3E033VJ INTRODUCTION OF OTHER HORMONE INTO PERIPHERAL VEIN, PERCUTANEOUS APPROACH: ICD-10-PCS | Performed by: OBSTETRICS & GYNECOLOGY

## 2021-11-30 RX ORDER — SODIUM CHLORIDE 0.9 % (FLUSH) 0.9 %
5-40 SYRINGE (ML) INJECTION AS NEEDED
Status: DISCONTINUED | OUTPATIENT
Start: 2021-11-30 | End: 2021-12-02 | Stop reason: HOSPADM

## 2021-11-30 RX ORDER — SODIUM CHLORIDE 0.9 % (FLUSH) 0.9 %
5-40 SYRINGE (ML) INJECTION EVERY 8 HOURS
Status: DISCONTINUED | OUTPATIENT
Start: 2021-11-30 | End: 2021-12-02 | Stop reason: HOSPADM

## 2021-11-30 RX ORDER — EPHEDRINE SULFATE/0.9% NACL/PF 50 MG/5 ML
10 SYRINGE (ML) INTRAVENOUS
Status: DISCONTINUED | OUTPATIENT
Start: 2021-11-30 | End: 2021-11-30 | Stop reason: HOSPADM

## 2021-11-30 RX ORDER — HYDROCORTISONE ACETATE PRAMOXINE HCL 2.5; 1 G/100G; G/100G
CREAM TOPICAL AS NEEDED
Status: DISCONTINUED | OUTPATIENT
Start: 2021-11-30 | End: 2021-12-02 | Stop reason: HOSPADM

## 2021-11-30 RX ORDER — HYDROCORTISONE 1 %
CREAM (GRAM) TOPICAL AS NEEDED
Status: DISCONTINUED | OUTPATIENT
Start: 2021-11-30 | End: 2021-12-02 | Stop reason: HOSPADM

## 2021-11-30 RX ORDER — OXYTOCIN/RINGER'S LACTATE 30/500 ML
87.3 PLASTIC BAG, INJECTION (ML) INTRAVENOUS AS NEEDED
Status: DISCONTINUED | OUTPATIENT
Start: 2021-11-30 | End: 2021-12-02 | Stop reason: HOSPADM

## 2021-11-30 RX ORDER — OXYCODONE AND ACETAMINOPHEN 5; 325 MG/1; MG/1
1 TABLET ORAL
Status: DISCONTINUED | OUTPATIENT
Start: 2021-11-30 | End: 2021-12-02 | Stop reason: HOSPADM

## 2021-11-30 RX ORDER — DIPHENHYDRAMINE HCL 25 MG
25 CAPSULE ORAL
Status: DISCONTINUED | OUTPATIENT
Start: 2021-11-30 | End: 2021-12-02 | Stop reason: HOSPADM

## 2021-11-30 RX ORDER — FENTANYL/BUPIVACAINE/NS/PF 2-1250MCG
1-16 PREFILLED PUMP RESERVOIR EPIDURAL CONTINUOUS
Status: DISCONTINUED | OUTPATIENT
Start: 2021-11-30 | End: 2021-11-30 | Stop reason: HOSPADM

## 2021-11-30 RX ORDER — DOCUSATE SODIUM 100 MG/1
100 CAPSULE, LIQUID FILLED ORAL
Status: DISCONTINUED | OUTPATIENT
Start: 2021-11-30 | End: 2021-12-02 | Stop reason: HOSPADM

## 2021-11-30 RX ORDER — BUPIVACAINE HYDROCHLORIDE 2.5 MG/ML
INJECTION, SOLUTION EPIDURAL; INFILTRATION; INTRACAUDAL AS NEEDED
Status: DISCONTINUED | OUTPATIENT
Start: 2021-11-30 | End: 2021-11-30 | Stop reason: HOSPADM

## 2021-11-30 RX ORDER — ONDANSETRON 4 MG/1
4 TABLET, ORALLY DISINTEGRATING ORAL
Status: DISCONTINUED | OUTPATIENT
Start: 2021-11-30 | End: 2021-12-02 | Stop reason: HOSPADM

## 2021-11-30 RX ORDER — HYDROXYCHLOROQUINE SULFATE 200 MG/1
400 TABLET, FILM COATED ORAL
Status: DISCONTINUED | OUTPATIENT
Start: 2021-11-30 | End: 2021-12-02 | Stop reason: HOSPADM

## 2021-11-30 RX ORDER — SIMETHICONE 80 MG
80 TABLET,CHEWABLE ORAL
Status: DISCONTINUED | OUTPATIENT
Start: 2021-11-30 | End: 2021-12-02 | Stop reason: HOSPADM

## 2021-11-30 RX ORDER — LANOLIN ALCOHOL/MO/W.PET/CERES
3 CREAM (GRAM) TOPICAL
Status: DISCONTINUED | OUTPATIENT
Start: 2021-11-30 | End: 2021-12-02 | Stop reason: HOSPADM

## 2021-11-30 RX ORDER — BUPIVACAINE HYDROCHLORIDE 2.5 MG/ML
INJECTION, SOLUTION EPIDURAL; INFILTRATION; INTRACAUDAL
Status: COMPLETED
Start: 2021-11-30 | End: 2021-11-30

## 2021-11-30 RX ORDER — FENTANYL CITRATE 50 UG/ML
INJECTION, SOLUTION INTRAMUSCULAR; INTRAVENOUS AS NEEDED
Status: DISCONTINUED | OUTPATIENT
Start: 2021-11-30 | End: 2021-11-30 | Stop reason: HOSPADM

## 2021-11-30 RX ORDER — AMMONIA 15 % (W/V)
1 AMPUL (EA) INHALATION AS NEEDED
Status: DISCONTINUED | OUTPATIENT
Start: 2021-11-30 | End: 2021-12-02 | Stop reason: HOSPADM

## 2021-11-30 RX ORDER — OXYCODONE AND ACETAMINOPHEN 5; 325 MG/1; MG/1
2 TABLET ORAL
Status: DISCONTINUED | OUTPATIENT
Start: 2021-11-30 | End: 2021-12-02 | Stop reason: HOSPADM

## 2021-11-30 RX ORDER — OXYTOCIN/RINGER'S LACTATE 30/500 ML
10 PLASTIC BAG, INJECTION (ML) INTRAVENOUS AS NEEDED
Status: DISCONTINUED | OUTPATIENT
Start: 2021-11-30 | End: 2021-12-02 | Stop reason: HOSPADM

## 2021-11-30 RX ORDER — FENTANYL CITRATE 50 UG/ML
100 INJECTION, SOLUTION INTRAMUSCULAR; INTRAVENOUS ONCE
Status: COMPLETED | OUTPATIENT
Start: 2021-11-30 | End: 2021-11-30

## 2021-11-30 RX ORDER — IBUPROFEN 400 MG/1
800 TABLET ORAL EVERY 8 HOURS
Status: DISCONTINUED | OUTPATIENT
Start: 2021-11-30 | End: 2021-12-02 | Stop reason: HOSPADM

## 2021-11-30 RX ORDER — NALOXONE HYDROCHLORIDE 0.4 MG/ML
0.4 INJECTION, SOLUTION INTRAMUSCULAR; INTRAVENOUS; SUBCUTANEOUS AS NEEDED
Status: DISCONTINUED | OUTPATIENT
Start: 2021-11-30 | End: 2021-11-30 | Stop reason: HOSPADM

## 2021-11-30 RX ADMIN — OXYTOCIN 909 MILLI-UNITS/MIN: 10 INJECTION, SOLUTION INTRAMUSCULAR; INTRAVENOUS at 21:10

## 2021-11-30 RX ADMIN — SODIUM CHLORIDE, POTASSIUM CHLORIDE, SODIUM LACTATE AND CALCIUM CHLORIDE 125 ML/HR: 600; 310; 30; 20 INJECTION, SOLUTION INTRAVENOUS at 00:07

## 2021-11-30 RX ADMIN — BUTORPHANOL TARTRATE 2 MG: 1 INJECTION, SOLUTION INTRAMUSCULAR; INTRAVENOUS at 02:57

## 2021-11-30 RX ADMIN — BUPIVACAINE HYDROCHLORIDE 10 ML: 2.5 INJECTION, SOLUTION EPIDURAL; INFILTRATION; INTRACAUDAL; PERINEURAL at 14:22

## 2021-11-30 RX ADMIN — SODIUM CHLORIDE, POTASSIUM CHLORIDE, SODIUM LACTATE AND CALCIUM CHLORIDE 125 ML/HR: 600; 310; 30; 20 INJECTION, SOLUTION INTRAVENOUS at 14:33

## 2021-11-30 RX ADMIN — HYDROXYCHLOROQUINE SULFATE 400 MG: 200 TABLET ORAL at 23:10

## 2021-11-30 RX ADMIN — FENTANYL CITRATE 100 MCG: 50 INJECTION, SOLUTION INTRAMUSCULAR; INTRAVENOUS at 14:22

## 2021-11-30 RX ADMIN — OXYTOCIN 1 MILLI-UNITS/MIN: 10 INJECTION INTRAVENOUS at 00:10

## 2021-11-30 RX ADMIN — BUTORPHANOL TARTRATE 2 MG: 1 INJECTION, SOLUTION INTRAMUSCULAR; INTRAVENOUS at 10:28

## 2021-11-30 RX ADMIN — IBUPROFEN 800 MG: 400 TABLET, FILM COATED ORAL at 23:09

## 2021-11-30 RX ADMIN — Medication 10 ML/HR: at 14:27

## 2021-11-30 RX ADMIN — PROMETHAZINE HYDROCHLORIDE: 25 INJECTION INTRAMUSCULAR; INTRAVENOUS at 02:54

## 2021-11-30 RX ADMIN — SODIUM CHLORIDE, POTASSIUM CHLORIDE, SODIUM LACTATE AND CALCIUM CHLORIDE 1000 ML: 600; 310; 30; 20 INJECTION, SOLUTION INTRAVENOUS at 18:39

## 2021-11-30 RX ADMIN — SODIUM CHLORIDE, POTASSIUM CHLORIDE, SODIUM LACTATE AND CALCIUM CHLORIDE 125 ML/HR: 600; 310; 30; 20 INJECTION, SOLUTION INTRAVENOUS at 08:56

## 2021-11-30 NOTE — ANESTHESIA PREPROCEDURE EVALUATION
Relevant Problems   No relevant active problems       Anesthetic History   No history of anesthetic complications            Review of Systems / Medical History  Patient summary reviewed, nursing notes reviewed and pertinent labs reviewed    Pulmonary  Within defined limits                 Neuro/Psych   Within defined limits           Cardiovascular  Within defined limits                Exercise tolerance: >4 METS     GI/Hepatic/Renal  Within defined limits              Endo/Other  Within defined limits           Other Findings   Comments: scleroderma           Physical Exam    Airway  Mallampati: II  TM Distance: 4 - 6 cm  Neck ROM: normal range of motion   Mouth opening: Normal     Cardiovascular    Rhythm: regular  Rate: normal         Dental  No notable dental hx       Pulmonary  Breath sounds clear to auscultation               Abdominal  Abdominal exam normal       Other Findings            Anesthetic Plan    ASA: 2  Anesthesia type: epidural            Anesthetic plan and risks discussed with: Patient

## 2021-11-30 NOTE — ANESTHESIA PROCEDURE NOTES
Epidural Block    Patient location during procedure: OB  Reason for block: labor epidural  Staffing  Performed: attending   Anesthesiologist: Khoi Han MD  Preanesthetic Checklist  Completed: patient identified, IV checked, site marked, risks and benefits discussed, surgical consent, monitors and equipment checked, pre-op evaluation and timeout performed  Block Placement  Patient position: sitting  Prep: DuraPrep  Sterility prep: cap, drape, gloves and mask  Sedation level: no sedation  Patient monitoring: continuous pulse oximetry and heart rate  Approach: midline  Location: lumbar  Lumbar location: L4-L5  Epidural  Loss of resistance technique: saline  Guidance: landmark technique  Needle  Needle type: Tuohy   Needle gauge: 17 G  Needle length: 9 cm  Needle insertion depth: 7 cm  Catheter type: end hole  Catheter size: 19 G  Catheter at skin depth: 12 cm  Catheter securement method: clear occlusive dressing, surgical tape and liquid medical adhesive  Assessment  Sensory level: T6  Block outcome: pain improved  Number of attempts: 1  Procedure assessment: patient tolerated procedure well with no immediate complications

## 2021-11-30 NOTE — PROGRESS NOTES
0720 Bedside and Verbal shift change report given to SHANTANU Adkins,RN (oncoming nurse) by PREET Byers,MOSHE (offgoing nurse). Report included the following information SBAR, Kardex, MAR and Recent Results. Pt resting. 7605  Dr. Omar Mejia at bedside, sve 3-4/50/-3, cervical balloon removed. 1028  Stadol given for pain  1420  Dr. Lucero Celis at bedside, epidural placed and dosed. 1440  Pt comfortable, Dr. Omar Mejia at bedside to discuss plan of care. 1519  srom clear fluid   1528  Dr. Omar Mejia sve 6/70/-2  1535  Bedside report given to PREET Hooks

## 2021-11-30 NOTE — PROGRESS NOTES
1530 Bedside and Verbal shift change report given to PREET Green RN  (oncoming nurse) by [employer]. Sridevi Vallecillo RN  (offgoing nurse). Report included the following information SBAR, Procedure Summary, Intake/Output, MAR and Recent Results. 200 Dr. Janie Ward notified of patient having early decels. Request for me to check cervix. SVE 8cm - moderate amount of bright red bleeding with 2 6 cm clots noted. Dr. Janie Ward notified and requesting bedside evaluation. [id number] Dr. Janie Ward at bedside. Reviewed fetal monitor strip. Viewing clots on chux. SVE 8 . Request for pitocin to be turned off and 1 liter LR bolus. Kyle Ward requesting for Pitocin to be turned back on at 8cc/hr     Dr Janie Ward called and notified of patient feeling intermittent rectal pressure. Plan to check patient and begin pushing if feeling constant pressure.  Begin pushing       RN at bedside, continuously monitoring FHR tracing     RN at bedside, continuously monitoring FHR tracing      of liveborn female by Dr. Janie Ward. 2315 TRANSFER - OUT REPORT:    Verbal report given to [address] (name) on Ria Garcia  being transferred to MIU(unit) for routine progression of care       Report consisted of patients Situation, Background, Assessment and   Recommendations(SBAR). Information from the following report(s) SBAR, Procedure Summary, MAR and Recent Results was reviewed with the receiving nurse. Lines:   Peripheral IV 21 Anterior; Left Forearm (Active)   Site Assessment Clean, dry, & intact 21 1558   Phlebitis Assessment 0 21 1558   Infiltration Assessment 0 21 1558   Dressing Status Clean, dry, & intact 21 1558   Dressing Type Tape; Transparent 21 1558   Hub Color/Line Status Infusing 21 1558        Opportunity for questions and clarification was provided.       Patient transported with:   Registered Nurse

## 2021-11-30 NOTE — ROUTINE PROCESS
0500 Received OB SBAR Report from PREET Madsen RN    4856 Patient sleeping comfortably in bed    0521 Assisted patient to bathroom    0730 OB SBAR Report given to SHANTANU Us RN

## 2021-11-30 NOTE — PROGRESS NOTES
Doing well, active FM. Feeling more uncomfortable with contractions. Visit Vitals  /62   Pulse 80   Temp 97.6 °F (36.4 °C)   Resp 16   Ht 5' 4\" (1.626 m)   Wt 73.9 kg (163 lb)   BMI 27.98 kg/m²     Patient Vitals for the past 4 hrs: Mode Fetal Heart Rate Fetal Activity Variability Decelerations Accelerations RN Reviewed Strip?  FHR Interventions   11/30/21 1030 External 150 -- 6-25 BPM None Yes Yes --   11/30/21 1015 External 145 -- -- -- -- Yes --   11/30/21 1000 External 150 -- 6-25 BPM None No Yes --   11/30/21 0945 External 150 -- -- -- -- Yes --   11/30/21 0940 -- -- -- -- -- -- -- Lateral Left   11/30/21 0930 External 150 -- 6-25 BPM None Yes Yes --   11/30/21 0915 External 150 -- -- -- -- Yes --   11/30/21 0900 External 150 -- 6-25 BPM None Yes Yes --   11/30/21 0845 External 145 -- -- -- -- Yes --   11/30/21 0830 External 145 -- 6-25 BPM None Yes Yes --   11/30/21 0815 External 145 -- -- -- -- Yes --   11/30/21 0800 External 145 -- 6-25 BPM Variable Yes Yes --   11/30/21 0745 External 140 -- -- -- -- Yes --   11/30/21 0730 External 140 -- 6-25 BPM None Yes Yes --   11/30/21 0715 External 140 Present -- -- -- Yes --   11/30/21 0700 External 145 Present 6-25 BPM None Yes Yes --   11/30/21 0645 External 145 Present -- -- -- Yes --     Category 1 fetal heart tracing    Cervical Exam  Dilation (cm): 3.5  Eff: 50 %  Station: -3  Vaginal exam done by? : Rosalie  (check limited by intolerance to exam)    EFW 7#    G1 39 0/7 IOL for scleroderma stable on plaquenil  - abreu removed   - continue pitocin titration  - ok for epidural when desired  - plan for AROM once pt comfortable with epidural  - CEFM  - GBS neg  - COVID neg    Conrad Ham MD

## 2021-11-30 NOTE — PROGRESS NOTES
Doing well. Active FM. Notes clear leakage of fluid. No complaints. Comfortable with epidural.    Visit Vitals  /71 (BP 1 Location: Left upper arm, BP Patient Position: At rest)   Pulse 90   Temp 98.2 °F (36.8 °C)   Resp 16   Ht 5' 4\" (1.626 m)   Wt 73.9 kg (163 lb)   SpO2 99%   BMI 27.98 kg/m²     Cervical Exam  Dilation (cm): 6  Eff: 70 %  Station: -2  Vaginal exam done by? : Sarraf  Membrane Status: SROM    EFW 7#    Patient Vitals for the past 4 hrs:    Mode Fetal Heart Rate Variability Decelerations Accelerations RN Reviewed Strip?   11/30/21 1630 External 140 6-25 BPM None Yes Yes   11/30/21 1615 External 145 -- -- -- Yes   11/30/21 1600 External 150 6-25 BPM Variable Yes Yes   11/30/21 1545 External 150 -- -- -- Yes   11/30/21 1530 External 145 6-25 BPM Variable Yes Yes   11/30/21 1515 External 150 -- -- -- Yes   11/30/21 1500 External 150 6-25 BPM None Yes Yes   11/30/21 1445 External 145 -- -- -- Yes   11/30/21 1430 External 145 6-25 BPM None Yes Yes   11/30/21 1400 External 150 6-25 BPM None Yes Yes   11/30/21 1345 External 150 -- -- -- Yes   11/30/21 1330 External 150 (!) Less than or equal to 5 BPM None No Yes   11/30/21 1315 External 150 -- -- -- Yes   11/30/21 1300 External 150 6-25 BPM None Yes Yes     Category 1 fetal heart tracing  Contractions every 2-3 minutes    G1 39 0/7 IOL for scleroderma  - cont pitocin titration  - GBS neg  - COVID neg  - recheck 4 hrs after last check or sooner if needed  - CEFM  - scleroderma stable on plaquenil    Jeana Nino MD

## 2021-12-01 PROCEDURE — 74011250637 HC RX REV CODE- 250/637: Performed by: MIDWIFE

## 2021-12-01 PROCEDURE — 74011250637 HC RX REV CODE- 250/637: Performed by: OBSTETRICS & GYNECOLOGY

## 2021-12-01 PROCEDURE — 65410000002 HC RM PRIVATE OB

## 2021-12-01 RX ORDER — ACETAMINOPHEN 325 MG/1
650 TABLET ORAL
Status: DISCONTINUED | OUTPATIENT
Start: 2021-12-01 | End: 2021-12-02 | Stop reason: HOSPADM

## 2021-12-01 RX ORDER — IBUPROFEN 600 MG/1
600 TABLET ORAL
Qty: 40 TABLET | Refills: 0 | Status: SHIPPED | OUTPATIENT
Start: 2021-12-01 | End: 2022-03-28

## 2021-12-01 RX ADMIN — OXYCODONE AND ACETAMINOPHEN 1 TABLET: 5; 325 TABLET ORAL at 10:40

## 2021-12-01 RX ADMIN — ACETAMINOPHEN 650 MG: 325 TABLET ORAL at 20:36

## 2021-12-01 RX ADMIN — OXYCODONE AND ACETAMINOPHEN 1 TABLET: 5; 325 TABLET ORAL at 01:48

## 2021-12-01 RX ADMIN — HYDROXYCHLOROQUINE SULFATE 400 MG: 200 TABLET ORAL at 22:00

## 2021-12-01 RX ADMIN — IBUPROFEN 800 MG: 400 TABLET, FILM COATED ORAL at 16:18

## 2021-12-01 RX ADMIN — IBUPROFEN 800 MG: 400 TABLET, FILM COATED ORAL at 07:53

## 2021-12-01 RX ADMIN — DOCUSATE SODIUM 100 MG: 100 CAPSULE ORAL at 13:22

## 2021-12-01 RX ADMIN — OXYCODONE AND ACETAMINOPHEN 1 TABLET: 5; 325 TABLET ORAL at 14:43

## 2021-12-01 NOTE — LACTATION NOTE
Initial Lactation Consultation: Infant born vaginally last evening to a  mom at 43 weeks gestation. Mom has a history of scleroderma and ankylosing spondylitis. She takes hydroxychloroquine (L2 risk category per [employer] Meds and Mother's Milk reference) daily to manage scleroderma. Mom noted breast changes during the pregnancy and has easily expressed colostrum. At the time of my visit mom in a lot of pain and not able to put infant to breast. Manually expressed approximately 1 ml of colostrum and spoon/finger fed it to the infant. Infant noted to have a tight jaw and did not make a lot of suck effort. Demonstrated jaw massage and suck training exercises to dad.

## 2021-12-01 NOTE — OP NOTES
This patient was 30 or more weeks gestation at the time of [facility] go-live. For complete information pertaining to this patient's pregnancy, please refer to the paper chart and ACOG form. Delivery Note    Obstetrician:  Alek Ashby MD    Assistant: none    Pre-Delivery Diagnosis: Term pregnancy and Pregnancy complicated by: Scleroderma    Post-Delivery Diagnosis: Living  infant(s) and Female \" Remona Shanika \"    Intrapartum Event: None    Procedure: Spontaneous vaginal delivery    Epidural: YES    Monitor:  Fetal Heart Tones - External and Uterine Contractions - External    Indications for instrumental delivery: none    Estimated Blood Loss: No data found    Episiotomy: none    Laceration(s):  2nd degree and periurethral    Laceration(s) repair: YES    Presentation: Cephalic    Fetal Description: dalal    Fetal Position: Occiput Anterior    Birth Weight:     Birth Length:     Apgar - One Minute: 8    Apgar - Five Minutes: 9    Umbilical Cord: 3 vessels present    EBL : 640 ml    Specimens: none           Complications:  none           Cord Blood Results:   Information for the patient's :  Sarah Poe [[id number]]   No results found for: PCTABR, PCTDIG, BILI, ABORH     Prenatal Labs:     Lab Results   Component Value Date/Time    ABO,Rh a pos 2021 12:00 AM    HBsAg, External negative  2021 12:00 AM    HIV, External non reactive 2021 12:00 AM    Rubella, External equivocal 2021 12:00 AM    GrBStrep, External negative 2021 12:00 AM        Attending Attestation: I performed the procedure    Signed By:  Alek Ashby MD     2021

## 2021-12-01 NOTE — PROGRESS NOTES
Verbal shift change report given to Eusebio Salgado RN (oncoming nurse) by Tona Zhang RN (offgoing nurse). Report included the following information SBAR, Kardex, Intake/Output, MAR and Recent Results.

## 2021-12-01 NOTE — DISCHARGE SUMMARY
Obstetrical Discharge Summary     Name: Harris Tamayo MRN: [id number]  SSN: [id number]    YOB: 1995  Age: 27 y.o. Sex: female      Admit Date: 2021    Discharge Date: 2021     Admitting Physician: Augusto Najera MD     Attending Physician:  Taco Franco MD     Admission Diagnoses: 39 weeks gestation of pregnancy [Z3A.39]    Discharge Diagnoses:   Information for the patient's :  Bright Mcconnell [[id number]]   Delivery of a 2.925 kg female infant via Vaginal, Spontaneous on 2021 at 9:03 PM  by Parminder Hernandez. Apgars were 9  and 9 . Additional Diagnoses:   Hospital Problems  Never Reviewed          Codes Class Noted POA    39 weeks gestation of pregnancy ICD-10-CM: Z3A.39  ICD-9-CM: V22.2  2021 Unknown             Lab Results   Component Value Date/Time    Rubella, External equivocal 2021 12:00 AM    GrBStrep, External negative 2021 12:00 AM       Hospital Course: Normal hospital course following the delivery. Patient Instructions:   Current Discharge Medication List      START taking these medications    Details   ibuprofen (MOTRIN) 600 mg tablet Take 1 Tablet by mouth every six (6) hours as needed for Pain. Qty: 40 Tablet, Refills: 0         CONTINUE these medications which have NOT CHANGED    Details   hydrOXYchloroQUINE (PLAQUENIL) 200 mg tablet Take 400 mg by mouth daily. PNV Comb #2-Iron-Omega 3-FA [id number] mg cmpk Take  by mouth. Disposition at Discharge: Home or self care    Condition at Discharge: Stable    Reference my discharge instructions. Follow-up Appointments   Procedures    FOLLOW UP VISIT Appointment in: 6 Weeks     Standing Status:   Standing     Number of Occurrences:   1     Order Specific Question:   Appointment in     Answer:   6 Weeks        Signed By:  Epifanio Patino MD     2021                    .

## 2021-12-01 NOTE — PROGRESS NOTES
Post-Partum Day Number 1 Progress Note    Claudene Blaze     Assessment: Doing well, post partum day 1  Scleroderma, possible SLE: on Plaquenil    Plan:  - Continue routine postpartum and perineal care as well as maternal education.  - Female , doing well  - Plan discharge home [id number] Mark Maliha Discharge Date: Tomorrow. Information for the patient's :  Melani Client [[id number]]   Vaginal, Spontaneous      Subjective:  Patient doing well without significant complaint. Voiding without difficulty, normal lochia. Vitals:  Visit Vitals  /66   Pulse 68   Temp 98.1 °F (36.7 °C)   Resp 16   Ht 5' 4\" (1.626 m)   Wt 73.9 kg (163 lb)   SpO2 98%   Breastfeeding Unknown   BMI 27.98 kg/m²     Temp (24hrs), Av.2 °F (36.8 °C), Min:97.6 °F (36.4 °C), Max:98.5 °F (36.9 °C)        Exam:   Patient without distress. Fundus firm, nontender per nursing fundal checks. Perineum with normal lochia noted per nursing assessment. Lower extremities are negative for pathological edema. Labs:     Lab Results   Component Value Date/Time    WBC 13.6 (H) 2021 05:40 PM    HGB 11.7 2021 05:40 PM    HCT 35.0 2021 05:40 PM    PLATELET 010  05:40 PM       No results found for this or any previous visit (from the past 24 hour(s)).

## 2021-12-01 NOTE — ROUTINE PROCESS
Bedside shift change report given to REYES Grove RN  (oncoming nurse) by ZAC Levine RN (offgoing nurse). Report included the following information SBAR, Intake/Output and MAR.

## 2021-12-01 NOTE — ROUTINE PROCESS
0800- Preceptor review of WEST Alexander RN shift time [id number]. The documentation on patient care has been approved and reviewed. All medications have been administered under the direct supervision of the preceptor.

## 2021-12-01 NOTE — ANESTHESIA POSTPROCEDURE EVALUATION
* No procedures listed *.    epidural    <BSHSIANPOST>    INITIAL Post-op Vital signs:   Vitals Value Taken Time   /57 11/30/21 2154   Temp     Pulse 83 11/30/21 2154   Resp 16 11/30/21 2154   SpO2

## 2021-12-01 NOTE — LACTATION NOTE
This note was copied from a baby's chart. Mom states baby has been sleepy and not wanting to latch or nurse. Baby would not suck on my finger. She was gagging. I took baby to the nursery and deep suctioned her for a large amount of clear fluid. Baby was then beginning to root. I placed baby on mom in the prone position. She was rooting and attempting to latch. Moms nipple is large and baby was not able to get it in her mouth. I showed her how to apply the shield and then with sweetease we were able to get baby to latch. She had a strong suck on the shield. We were able to get the baby latched with the shield on both breasts with the use of sweetease. I helped mom with hand expression and we were able to express large drops of colostrum from both breasts. We pulled the colostrum up in a syringe and mom will save it to use at the next feeding.

## 2021-12-01 NOTE — PROGRESS NOTES
Labor Progress Note  Patient seen, fetal heart rate and contraction pattern evaluated. Assumed care from Dr. Osmel Lowery. G1 @ 39 weeks undergoing IOL for Sclerderma. VSS AF  Physical Exam:  Cervical Exam: C/C/+2  Membranes:  SROM  Uterine Activity: Frequency: regular  Fetal Heart Rate: Reactive  Accelerations: yes  Decelerations: early  Uterine contractions: regular    Assessment/Plan:  Reassuring fetal status.    Prepare to push for delivery    Luz Fontaine MD

## 2021-12-01 NOTE — ROUTINE PROCESS
TRANSFER - IN REPORT:    Verbal report received from [address] [place] [address], [address], RN(name) on Theron Gun  being received from L & D(unit) for routine progression of care      Report consisted of patients Situation, Background, Assessment and   Recommendations(SBAR). Information from the following report(s) SBAR was reviewed with the receiving nurse. Opportunity for questions and clarification was provided. Assessment completed upon patients arrival to unit and care assumed.

## 2021-12-02 VITALS
BODY MASS INDEX: 27.83 KG/M2 | DIASTOLIC BLOOD PRESSURE: 77 MMHG | RESPIRATION RATE: 16 BRPM | HEART RATE: 76 BPM | WEIGHT: 163 LBS | TEMPERATURE: 97.8 F | OXYGEN SATURATION: 98 % | HEIGHT: 64 IN | SYSTOLIC BLOOD PRESSURE: 114 MMHG

## 2021-12-02 PROCEDURE — 74011250637 HC RX REV CODE- 250/637: Performed by: OBSTETRICS & GYNECOLOGY

## 2021-12-02 PROCEDURE — 74011250637 HC RX REV CODE- 250/637: Performed by: MIDWIFE

## 2021-12-02 RX ADMIN — ACETAMINOPHEN 650 MG: 325 TABLET ORAL at 05:24

## 2021-12-02 RX ADMIN — DOCUSATE SODIUM 100 MG: 100 CAPSULE ORAL at 14:03

## 2021-12-02 RX ADMIN — IBUPROFEN 800 MG: 400 TABLET, FILM COATED ORAL at 09:58

## 2021-12-02 RX ADMIN — IBUPROFEN 800 MG: 400 TABLET, FILM COATED ORAL at 00:39

## 2021-12-02 NOTE — PROGRESS NOTES
Post-Partum Day Number 2 Progress Note    Gibran De Los Santos     Assessment: Doing well, post partum day 2    Scleroderma, possible SLE: on Plaquenil    Plan:   - Discharge home today  - Follow up in office in 6 week(s) with [facility].  - Pain medication prescription(s) sent. - Questions answered. Information for the patient's :  Radha Lee [[id number]]   Vaginal, Spontaneous    Patient doing well without significant complaint. Voiding without difficulty, normal lochia. Ready for discharge home. Vitals:  Visit Vitals  /72 (BP 1 Location: Right upper arm, BP Patient Position: At rest;Semi fowlers)   Pulse 77   Temp 97.9 °F (36.6 °C)   Resp 18   Ht 5' 4\" (1.626 m)   Wt 73.9 kg (163 lb)   SpO2 96%   Breastfeeding Unknown   BMI 27.98 kg/m²     Temp (24hrs), Av.2 °F (36.8 °C), Min:97.9 °F (36.6 °C), Max:98.7 °F (37.1 °C)      Exam:        Patient without distress. Fundus firm, appropriately tender                Perineum with normal lochia noted per nursing assessment                Lower extremities are negative for pathological edema    Labs:     Lab Results   Component Value Date/Time    WBC 13.6 (H) 2021 05:40 PM    HGB 11.7 2021 05:40 PM    HCT 35.0 2021 05:40 PM    PLATELET 056  05:40 PM       No results found for this or any previous visit (from the past 24 hour(s)).

## 2021-12-02 NOTE — LACTATION NOTE
This note was copied from a baby's chart. Infant had been using nipple shield. Baby progressed to nursing directly this morning. Parents learned several new latch and positioning techniques and make a good team.  Infant is alert and eager today. Mother's milk is coming in. She will follow up with outpatient lactation consultant at pediatrician's office tomorrow  Mother states that she has no further questions for Lactation Consultant before discharge.

## 2021-12-02 NOTE — DISCHARGE INSTRUCTIONS
POSTPARTUM DISCHARGE INSTRUCTIONS       Name:  Juan José Grady  YOB: 1995  Admission Diagnosis:  44 weeks gestation of pregnancy [Z3A.39]     Discharge Diagnosis:    Problem List as of 12/2/2021 Never Reviewed          Codes Class Noted - Resolved    39 weeks gestation of pregnancy ICD-10-CM: Z3A.39  ICD-9-CM: V22.2  11/29/2021 - Present            Attending Physician:  Tesha Marquis MD    Delivery Type:  Vaginal Childbirth with Episiotomy, Laceration or Tear: What To Expect At [address] [place] [address] body will slowly heal in the next few weeks. It is easy to get too tired and overwhelmed during the first weeks after your baby is born. Changes in your hormones can shift your mood without warning. You may find it hard to meet the extra demands on your energy and time. Take it easy on yourself. Follow-up care is a key part of your treatment and safety. Be sure to make and go to all appointments, and call your doctor if you are having problems. It's also a good idea to know your test results and keep a list of the medicines you take. How can you care for yourself at home? Vaginal Bleeding and Cramps  · After delivery, you will have a bloody discharge from the vagina. This will turn pink within a week and then white or yellow after about 10 days. It may last for 2 to 4 weeks or longer, until the uterus has healed. Use pads instead of tampons until you stop bleeding. · Do not worry if you pass some blood clots, as long as they are smaller than a golf ball. If you have a tear or stitches in your vaginal area, change the pad at least every 4 hours to prevent soreness and infection. · You may have cramps for the first few days after childbirth. These are normal and occur as the uterus shrinks to normal size. Take an over-the-counter pain medicine, such as acetaminophen (Tylenol), ibuprofen (Advil, Motrin), or naproxen (Aleve), for cramps. Read and follow all instructions on the label.  Do not take aspirin, because it can cause more bleeding. Do not take acetaminophen (Tylenol) and other acetaminophen containing medications (i.e. Percocet) at the same time. Episiotomy, Lacerations or Tears  · If you have stitches, they will dissolve on their own and do not need to be removed. · Put ice or a cold pack on your painful area for 10 to 20 minutes at a time, several times a day, for the first few days. Put a thin cloth between the ice and your skin. · Sit in a few inches of warm water (sitz bath) 3 times a day and after bowel movements. The warm water helps with pain and itching. If you do not have a tub, a warm shower might help. Breast fullness  · Your breasts may overfill (engorge) in the first few days after delivery. To help milk flow and to relieve pain, warm your breasts in the shower or by using warm, moist towels before nursing. · If you are not nursing, do not put warmth on your breasts or touch your breasts. Wear a tight bra or sports bra and use ice until the fullness goes away. This usually takes 2 to 3 days. · Put ice or a cold pack on your breast after nursing to reduce swelling and pain. Put a thin cloth between the ice and your skin. Activity  · Eat a balanced diet. Do not try to lose weight by cutting calories. Keep taking your prenatal vitamins, or take a multivitamin. · Get as much rest as you can. Try to take naps when your baby sleeps during the day. · Get some exercise every day. But do not do any heavy exercise until your doctor says it is okay. · Wait until you are healed (about 4 to 6 weeks) before you have sexual intercourse. Your doctor will tell you when it is okay to have sex. · Talk to your doctor about birth control. You can get pregnant even before your period returns. Also, you can get pregnant while you are breast-feeding. Mental Health  · Many women get the \"baby blues\" during the first few days after childbirth.  You may lose sleep, feel irritable, and cry easily. You may feel happy one minute and sad the next. Hormone changes are one cause of these emotional changes. Also, the demands of a new baby, along with visits from relatives or other family needs, add to a mother's stress. The \"baby blues\" often peak around the fourth day. Then they ease up in less than 2 weeks. · If your moodiness or anxiety lasts for more than 2 weeks, or if you feel like life is not worth living, you may have postpartum depression. This is different for each mother. Some mothers with serious depression may worry intensely about their infant's well-being. Others may feel distant from their child. Some mothers might even feel that they might harm their baby. A mother may have signs of paranoia, wondering if someone is watching her. · With all the changes in your life, you may not know if you are depressed. Pregnancy sometimes causes changes in how you feel that are similar to the symptoms of depression. · Symptoms of depression include:  · Feeling sad or hopeless and losing interest in daily activities. These are the most common symptoms of depression. · Sleeping too much or not enough. · Feeling tired. You may feel as if you have no energy. · Eating too much or too little. · POSTPARTUM SUPPORT INTER[employer]ATIONAL (PSI) offers a Warm line; Chat with the Expert phone sessions; Information and Articles about Pregnancy and Postpartum Mood Disorders; Comprehensive List of Free Support Groups; Knowledgeable local coordinators who will offer support, information, and resources; Guide to Resources on CNS Response; Calendar of events in the  mood disorders community; Latest News and Research; and [facility] [address] for [employer]. Remember - You are not alone; You are not to blame; With help, you will be well. 6-887-967-PPD(2967). WWW. POSTPARTUM. NET    · Writing or talking about death, such as writing suicide notes or talking about guns, knives, or pills.  Keep the numbers for these national suicide hotlines: 2-914-314-TALK (6-618.505.7672) and 2-260-HDHQTAI (4-700.273.9931). If you or someone you know talks about suicide or feeling hopeless, get help right away. Constipation and Hemorrhoids  Drink plenty of fluids, enough so that your urine is light yellow or clear like water. If you have kidney, heart, or liver disease and have to limit fluids, talk with your doctor before you increase the amount of fluids you drink. · Eat plenty of fiber each day. Have a bran muffin or bran cereal for breakfast, and try eating a piece of fruit for a mid-afternoon snack. · For painful, itchy hemorrhoids, put ice or a cold pack on the area several times a day for 10 minutes at a time. Follow this by putting a warm compress on the area for another 10 to 20 minutes or by sitting in a shallow, warm bath. When should you call for help? Call 911 anytime you think you may need emergency care. For example, call if:  · You are thinking of hurting yourself, your baby, or anyone else. · You passed out (lost consciousness). · You have symptoms of a blood clot in your lung (called a pulmonary embolism). These may include:  · Sudden chest pain. · Trouble breathing. · Coughing up blood. Call your doctor now or seek immediate medical care if:  · You have severe vaginal bleeding. · You are soaking through a pad each hour for 2 or more hours. · Your vaginal bleeding seems to be getting heavier or is still bright red 4 days after delivery. · You are dizzy or lightheaded, or you feel like you may faint. · You are vomiting or cannot keep fluids down. · You have a fever. · You have new or more belly pain. · You pass tissue (not just blood). · Your vaginal discharge smells bad. · Your belly feels tender or full and hard. · Your breasts are continuously painful or red. · You feel sad, anxious, or hopeless for more than a few days. · You have sudden, severe pain in your belly.   · You have symptoms of a blood clot in your leg (called a deep vein thrombosis), such as:  · Pain in your calf, back of the knee, thigh, or groin. · Redness and swelling in your leg or groin. · You have symptoms of preeclampsia, such as:  · Sudden swelling of your face, hands, or feet. · New vision problems (such as dimness or blurring). · A severe headache. · Your blood pressure is higher than it should be or rises suddenly. · You have new nausea or vomiting. Watch closely for changes in your health, and be sure to contact your doctor if you have any problems. Additional Information:  Not applicable    These are general instructions for a healthy lifestyle:    No smoking/ No tobacco products/ Avoid exposure to second hand smoke    Surgeon General's Warning:  Quitting smoking now greatly reduces serious risk to your health. Obesity, smoking, and sedentary lifestyle greatly increases your risk for illness    A healthy diet, regular physical exercise & weight monitoring are important for maintaining a healthy lifestyle    Recognize signs and symptoms of STROKE:    F-face looks uneven    A-arms unable to move or move unevenly    S-speech slurred or non-existent    T-time-call 911 as soon as signs and symptoms begin - DO NOT go       back to bed or wait to see if you get better - TIME IS BRAIN. I have had the opportunity to make my options or choices for discharge. I have received and understand these instructions. Postpartum Support Groups (virtual)  We know that all of us are dealing with a tremendous amount of uncertainty, confusion and disruption to our daily lives, which may result in increased anxiety, depression and fear. If you are feeling unsettled or worse, please know that we are here to help.  During this time of increased caution and care for one another, [facility]partum [facility] [place] ([address] [place] [address]) is offering virtual support groups to ALL MOTHERS in [place] regardless of the age of your child/children as a way to help weather this emotional storm together. Social support is an important part of self-care during this time of physical distancing. Virtual postpartum support group meetings available at www. postpartumva.org  Warm Line: 682.830.8166    Breastfeeding Support Groups (virtual)  1st and 3rd Wednesday of each month  2nd and 4th Tuesday of each month    [place] at www.[employer] under the \"About Us\" and \"Classes and Events tabs\"

## 2021-12-02 NOTE — PROGRESS NOTES
Bedside shift change report given to Holland Henley (oncoming nurse) by PREET Roy RN (offgoing nurse). Report included the following information SBAR.     1436  Signed copy of d/c instructions on paper chart.

## 2021-12-02 NOTE — ROUTINE PROCESS
Bedside shift change report given to PREET Enrique (oncoming nurse) by David Antoine (offgoing nurse). Report included the following information SBAR.

## 2022-01-21 ENCOUNTER — TELEPHONE (OUTPATIENT)
Dept: RHEUMATOLOGY | Age: 27
End: 2022-01-21

## 2022-03-20 PROBLEM — Z3A.39 39 WEEKS GESTATION OF PREGNANCY: Status: ACTIVE | Noted: 2021-11-29

## 2022-03-24 ENCOUNTER — OFFICE VISIT (OUTPATIENT)
Dept: RHEUMATOLOGY | Age: 27
End: 2022-03-24
Payer: COMMERCIAL

## 2022-03-24 VITALS
WEIGHT: 166 LBS | TEMPERATURE: 98.5 F | DIASTOLIC BLOOD PRESSURE: 72 MMHG | BODY MASS INDEX: 28.49 KG/M2 | RESPIRATION RATE: 18 BRPM | HEART RATE: 114 BPM | SYSTOLIC BLOOD PRESSURE: 104 MMHG

## 2022-03-24 DIAGNOSIS — Z79.899 LONG-TERM USE OF HYDROXYCHLOROQUINE: Primary | ICD-10-CM

## 2022-03-24 DIAGNOSIS — Z79.899 ONGOING USE OF POSSIBLY TOXIC MEDICATION: ICD-10-CM

## 2022-03-24 DIAGNOSIS — L94.0 MORPHEA: ICD-10-CM

## 2022-03-24 PROCEDURE — 99214 OFFICE O/P EST MOD 30 MIN: CPT | Performed by: INTERNAL MEDICINE

## 2022-03-24 RX ORDER — HYDROXYCHLOROQUINE SULFATE 200 MG/1
400 TABLET, FILM COATED ORAL DAILY
Qty: 180 TABLET | Refills: 3 | Status: SHIPPED | OUTPATIENT
Start: 2022-03-24

## 2022-03-24 RX ORDER — TACROLIMUS 1 MG/G
OINTMENT TOPICAL 2 TIMES DAILY
Qty: 30 G | Refills: 0 | Status: SHIPPED | OUTPATIENT
Start: 2022-03-24 | End: 2022-03-28 | Stop reason: SDUPTHER

## 2022-03-24 NOTE — PROGRESS NOTES
REASON FOR VISIT:   Gray Almanzar is a 27 y.o. female with history of morphea who is returning for a followup. HISTORY OF PRESENT ILLNESS    Tolerating Plaquenil (hydroxychloroquine) well, as long as she eats before she takes it. She states she did not take Plaquenil consistently during her recent pregnancy because of the sickness. Pt reports of lesion on R breast since last visit. She has not tried any topical creams. No return of earlier belt-line lesions. She is doing well since giving birth. Pt is breastfeeding. Disease History:  Diagnosed in 10th grade with scleroderma, after punch biopsies of trunk lesions by two dermatologists. Referred to Rheumatology who diagnosed localized scleroderma (morphea). Treated with IV methylprednisolone infusions monthly as well as weekly subQ methotrexate. Estimates she has been on subQ methotrexate for 6 of the last 12 years. Last treatment June 2017. Was extremely nauseous with methotrexate. Switched to mycophenolate (1000mg bid) which she tolerated better with only minor sleep disturbance. Took this for 7-8 months, stopped around mid-2018. After about a year and half off of treatment, then in May started noticing new lesions, now about the size of a tennis ball from the size of a fingernail initially. Hasn't ever treated with topical treatments. 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 No change in voice since the diagnosis. No genesis GERD symptoms. No globus sensation. Feels she bloats easily after eating. No genesis constipation. Never bloody stool. Mother has been given multiple autoimmune diagnoses including Crohn's disease, ankylosing spondylitis, and rheumatoid arthritis. 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     Hands and feet always feel cold in the winter, though don't appreciably change color in the cold; does get painful flushing and erythema with hot water or rewarming. While doing gymnastics she felt she would get disproportionately dyspneic. Went to pulmonologist and completed exercise testing, doesn't recall the results but no medications started for this. No chronic cough. She gets pain from low back down. Hips. Feels deep constant ache throughout legs. No extended morning stiffness. \"Feels like a deep muscle ache. \"    Feels sun sensitive, gets \"splotchy\" while she is in the sun, fades over an hour or two. When younger would look more like hives. REVIEW OF SYSTEMS  Negatives as follows:  Gerard Stands:    Denies unexplained persistent fevers, weight change, chronic fatigue  HEAD/EYES:              Denies eye redness, blurry vision or sudden loss of vision, dry eyes, HA, temporal pain  ENT:                            Denies oral/nasal ulcers, recurrent sinus infections, dry mouth  RESPIRATORY:         No pleuritic pain, history of pleural effusions, hemoptysis, exertional dyspnea  CARDIOVASCULAR:             Denies chest pain, history of pericardial effusions  GASTRO:                    Denies heartburn, esophageal dysmotility, abdominal pain, nausea, vomiting, diarrhea, blood in the stool  HEMATOLOGIC:        No easy bruising, purpura, swollen lymph nodes  SKIN:                           Denies alopecia, ulcers, nodules, sun sensitivity, unexplained persistent rash   VASCULAR:                Denies edema, cyanosis, raynaud phenomenon  NEUROLOGIC:           Denies specific muscle weakness, paresthesias   PSYCHIATRIC:           No sleep disturbance / snoring, depression, anxiety  MSK:                           No morning stiffness >=1 hour, SI joint pain, persistent joint swelling, persistent joint pain    Review of systems is as above and is otherwise negative.     ALLERGIES  Amoxicillin and Amoxicillin    MEDICATIONS  None current    PAST MEDICAL HISTORY  Significant only for morphea as per HPI    FAMILY HISTORY  family history includes Arthritis-rheumatoid in her mother; Crohn's Disease in her mother; Thyroid Disease in her mother. Mother has had iritis in the past. Younger brother has vitiligo. SOCIAL HISTORY  She  reports that she has never smoked. She has never used smokeless tobacco. She reports previous alcohol use. She reports previous drug use. 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 DATA  Visit Vitals  /72   Pulse (!) 114   Temp 98.5 °F (36.9 °C)   Resp 18   Wt 166 lb (75.3 kg)   Breastfeeding Unknown   BMI 28.49 kg/m²    Body mass index is 28.49 kg/m². No flowsheet data found. General:  The patient is well developed, well nourished, alert, and in no apparent distress. Eyes: Sclera are anicteric. No conjunctival injection. HEENT:  Oropharynx is clear. No oral ulcers. Adequate salivary pooling. No cervical or supraclavicular lymphadenopathy. Lungs:  Clear to auscultation bilaterally, without wheeze or stridor. Normal respiratory effort. Cor:  Regular rate and rhythm. No murmur rub or gallop. Abdomen: Soft, non-tender, without hepatomegaly or masses. Extremities: No calf tenderness or edema. Warm and well perfused. Skin: Silver dollar sized at 12 O'clock 1 cm above the areola on the right. Previous: Numerous stable hyperpigmented postinflammatory lesions across abdomen and back particularly along lower bra line and lateral beltline, Grossly stable ~5cm diameter edematous patch under left breast, 1cm diameter more erythematous patch under right breast. Still no sclerodactyly. Normal nailfold capillaries. Neuro: No foot or wrist drop. Normal gait. Musculoskeletal:    A comprehensive musculoskeletal exam was performed for all joints of each upper and lower extremity and assessed for swelling, tenderness and range of motion.  Results are documented as below:  No evidence of synovitis in the small joints of the hands, wrists, shoulders, elbows, hips, knees or ankles. Labs:  11/29/21: WBC 13.6, Hgb 11.7, Plt 220;   6/1/21: RNA pol3 neg, [id number], SSA and SSB neg  11/20/20: CBC WNL, CMP WNL (Cr 0.82), LOURDES 1:160 homogenous, ESR 9, CRP 2mg/L  11/3/15: CBC WNL  12/8/08: Tbili 0.4, AST 22, ALT 14, AlkP 259. Imaging:  None available      ASSESSMENT AND PLAN  Ms. Juan Carlos Shin is a 27 y.o. female who presents for follow up of morphea. One localized lesion in the interval on Plaquenil (hydroxychloroquine), will trial focal tacrolimus treatment, reviewed low systemic absorption, would just be cautious to avoid application before breast feedingto ensure affected site is not involved in latching area. Given higher systemic absorption and risk for striae with high potency topical corticosteroids on breast, will apply for prior authorization. 1. Morphea  - hydrOXYchloroQUINE (PLAQUENIL) 200 mg tablet; Take 2 Tablets by mouth daily. Dispense: 180 Tablet; Refill: 3  - tacrolimus (PROTOPIC) 0.1 % ointment; Apply  to affected area two (2) times a day. Dispense: 30 g; Refill: 0  - C REACTIVE PROTEIN, QT; Future  - CBC WITH AUTOMATED DIFF; Future  - METABOLIC PANEL, COMPREHENSIVE; Future  - SED RATE (ESR); Future    2. Ongoing use of possibly toxic medication  - hydrOXYchloroQUINE (PLAQUENIL) 200 mg tablet; Take 2 Tablets by mouth daily. Dispense: 180 Tablet; Refill: 3  - CBC WITH AUTOMATED DIFF; Future  - METABOLIC PANEL, COMPREHENSIVE; Future  - CBC WITH AUTOMATED DIFF  - METABOLIC PANEL, COMPREHENSIVE    3. Long-term use of hydroxychloroquine  - CBC WITH AUTOMATED DIFF; Future  - METABOLIC PANEL, COMPREHENSIVE; Future  Patient Instructions   1. Will renew Plaquenil 2 pills once a day. 2. Reach out to eye doctor in the next 3 months    3. Will apply to get tacrolimus cream approved by insurance. Apply twice a day. If cannot get insurance approved, will order triamcinolone cream.     4. Repeat labs in 2-3 months. Orders given to Pt.     5. Follow up in 6 months. Face to face time: 15 minutes  Note preparation and records review day of service: 20 minutes  Total provider time day of service: 35 minutes    This was scribed by Morteza Jones in the presence of Dr. Monica Rowley.      Christian Diop MD    Adult Rheumatology    [facility]  A Part of [facility] [address] [place] [address]   Phone [phone]  Fax [phone]

## 2022-03-24 NOTE — LETTER
3/28/2022 1:10 AM    Patient:  Howard Romero   YOB: 1995  Date of Visit: 3/24/2022      Dear Peyman Walters MD  [address] [place], [place] [address] [address]  Via Fax: [phone]: We recently saw Ms. Howard Romero in the [facility] for evaluation. My notes for this consultation are attached. If you have questions, please do not hesitate to call me. I look forward to following your patient along with you.       Sincerely,    Stefanie Rider MD [facility]  Cell: [phone]

## 2022-03-24 NOTE — PATIENT INSTRUCTIONS
1. Will renew Plaquenil 2 pills once a day. 2. Reach out to eye doctor in the next 3 months    3. Will apply to get tacrolimus cream approved by insurance. Apply twice a day. If cannot get insurance approved, will order triamcinolone cream.     4. Repeat labs in 2-3 months. Orders given to Pt. 5. Follow up in 6 months.

## 2022-03-28 ENCOUNTER — DOCUMENTATION ONLY (OUTPATIENT)
Dept: PHARMACY | Age: 27
End: 2022-03-28

## 2022-03-28 RX ORDER — TACROLIMUS 1 MG/G
OINTMENT TOPICAL 2 TIMES DAILY
Qty: 30 G | Refills: 1 | Status: SHIPPED | OUTPATIENT
Start: 2022-03-28 | End: 2022-03-29 | Stop reason: SDUPTHER

## 2022-03-28 NOTE — PROGRESS NOTES
[facility] at [address] [facility] Update    Date: 03/28/22    Palmer Liter 1995    Medication: Tacrolimus 0.1% ointment    Prior Authorization: through 3/28/2023    Prescription needs to be transferred to [facility] (phone: [phone]). [facility] ([phone]) was notified that this specialty prescription needs to be transferred to the insurance mandated specialty pharmacy above.      Luis Calhoun, [address] at [facility] [address], [address]  phone: [phone]   fax: [phone]

## 2022-03-29 DIAGNOSIS — L94.0 MORPHEA: ICD-10-CM

## 2022-03-29 RX ORDER — TACROLIMUS 1 MG/G
OINTMENT TOPICAL 2 TIMES DAILY
Qty: 30 G | Refills: 1 | Status: SHIPPED | OUTPATIENT
Start: 2022-03-29

## 2022-04-21 LAB
ALBUMIN SERPL-MCNC: 4.6 G/DL (ref 3.9–5)
ALBUMIN/GLOB SERPL: 1.8 {RATIO} (ref 1.2–2.2)
ALP SERPL-CCNC: 136 IU/L (ref 44–121)
ALT SERPL-CCNC: 19 IU/L (ref 0–32)
AST SERPL-CCNC: 18 IU/L (ref 0–40)
BASOPHILS # BLD AUTO: 0.1 X10E3/UL (ref 0–0.2)
BASOPHILS NFR BLD AUTO: 1 %
BILIRUB SERPL-MCNC: <0.2 MG/DL (ref 0–1.2)
BUN SERPL-MCNC: 15 MG/DL (ref 6–20)
BUN/CREAT SERPL: 17 (ref 9–23)
CALCIUM SERPL-MCNC: 9 MG/DL (ref 8.7–10.2)
CHLORIDE SERPL-SCNC: 100 MMOL/L (ref 96–106)
CO2 SERPL-SCNC: 23 MMOL/L (ref 20–29)
CREAT SERPL-MCNC: 0.87 MG/DL (ref 0.57–1)
CRP SERPL-MCNC: 3 MG/L (ref 0–10)
EGFR: 93 ML/MIN/1.73
EOSINOPHIL # BLD AUTO: 0.1 X10E3/UL (ref 0–0.4)
EOSINOPHIL NFR BLD AUTO: 2 %
ERYTHROCYTE [DISTWIDTH] IN BLOOD BY AUTOMATED COUNT: 12.4 % (ref 11.7–15.4)
ERYTHROCYTE [SEDIMENTATION RATE] IN BLOOD BY WESTERGREN METHOD: 2 MM/HR (ref 0–32)
GLOBULIN SER CALC-MCNC: 2.5 G/DL (ref 1.5–4.5)
GLUCOSE SERPL-MCNC: 95 MG/DL (ref 65–99)
HCT VFR BLD AUTO: 41.4 % (ref 34–46.6)
HGB BLD-MCNC: 13.8 G/DL (ref 11.1–15.9)
IMM GRANULOCYTES # BLD AUTO: 0 X10E3/UL (ref 0–0.1)
IMM GRANULOCYTES NFR BLD AUTO: 0 %
LYMPHOCYTES # BLD AUTO: 1.8 X10E3/UL (ref 0.7–3.1)
LYMPHOCYTES NFR BLD AUTO: 25 %
MCH RBC QN AUTO: 29.7 PG (ref 26.6–33)
MCHC RBC AUTO-ENTMCNC: 33.3 G/DL (ref 31.5–35.7)
MCV RBC AUTO: 89 FL (ref 79–97)
MONOCYTES # BLD AUTO: 0.7 X10E3/UL (ref 0.1–0.9)
MONOCYTES NFR BLD AUTO: 10 %
NEUTROPHILS # BLD AUTO: 4.5 X10E3/UL (ref 1.4–7)
NEUTROPHILS NFR BLD AUTO: 62 %
PLATELET # BLD AUTO: 277 X10E3/UL (ref 150–450)
POTASSIUM SERPL-SCNC: 5 MMOL/L (ref 3.5–5.2)
PROT SERPL-MCNC: 7.1 G/DL (ref 6–8.5)
RBC # BLD AUTO: 4.64 X10E6/UL (ref 3.77–5.28)
SODIUM SERPL-SCNC: 141 MMOL/L (ref 134–144)
WBC # BLD AUTO: 7.2 X10E3/UL (ref 3.4–10.8)

## 2022-09-02 ENCOUNTER — OFFICE VISIT (OUTPATIENT)
Dept: RHEUMATOLOGY | Age: 27
End: 2022-09-02
Payer: COMMERCIAL

## 2022-09-02 VITALS
RESPIRATION RATE: 16 BRPM | HEART RATE: 82 BPM | OXYGEN SATURATION: 98 % | DIASTOLIC BLOOD PRESSURE: 79 MMHG | SYSTOLIC BLOOD PRESSURE: 111 MMHG | HEIGHT: 64 IN | TEMPERATURE: 98.8 F | BODY MASS INDEX: 27.08 KG/M2 | WEIGHT: 158.6 LBS

## 2022-09-02 DIAGNOSIS — L94.0 MORPHEA: Primary | ICD-10-CM

## 2022-09-02 DIAGNOSIS — R29.1 MENINGISMUS: ICD-10-CM

## 2022-09-02 DIAGNOSIS — Z79.899 LONG-TERM USE OF HYDROXYCHLOROQUINE: ICD-10-CM

## 2022-09-02 DIAGNOSIS — R51.9 OCCIPITAL HEADACHE: ICD-10-CM

## 2022-09-02 PROCEDURE — 99214 OFFICE O/P EST MOD 30 MIN: CPT | Performed by: INTERNAL MEDICINE

## 2022-09-02 RX ORDER — ESCITALOPRAM OXALATE 10 MG/1
TABLET ORAL
COMMUNITY

## 2022-09-02 RX ORDER — IBUPROFEN 200 MG
400 TABLET ORAL
COMMUNITY

## 2022-09-02 NOTE — PROGRESS NOTES
REASON FOR VISIT:   Joceline Vidal is a 27 y.o. female with history of morphea who is returning for a followup. HISTORY OF PRESENT ILLNESS    Pt returns for a follow up. Pt reports that she has been having a lot of joint pain in the past week from her neck down to her hips. She says that her back pain has been so bad that she needed to have her mother in law come over to help take care of her kids. She described her pain as \"a bungee cord\" in her spine that is being stretched. She says that her pain is worse at night and it makes it hard for her to sleep. She notes that her pain is better today than it has been since the pain flare started up. She says that she has been using Motrin to try and deal with her pain but it is not doing to much to take the edge off. She notes that she was not using NSAIDs before this pain started. Pt denies fever, new rashes, oral or genital ulcers. She never used the tacro cream on the spots from her last visit because she says that she never has success with topicals. Pt says that when she wakes up in the middle of the night her throat feels dry and scratchy but it goes away when she drinks water. She says that she has been having this feeling since she started to have the joint pain. Pt has not received any vaccines in the last 1-2 months. Pt stays at home with her 10 month old daughter. Disease History:  Diagnosed in 10th grade with scleroderma, after punch biopsies of trunk lesions by two dermatologists. Referred to Rheumatology who diagnosed localized scleroderma (morphea). Treated with IV methylprednisolone infusions monthly as well as weekly subQ methotrexate. Estimates she has been on subQ methotrexate for 6 of the last 12 years. Last treatment June 2017. Was extremely nauseous with methotrexate. Switched to mycophenolate (1000mg bid) which she tolerated better with only minor sleep disturbance. Took this for 7-8 months, stopped around mid-2018.  After about a year and half off of treatment, then in May started noticing new lesions, now about the size of a tennis ball from the size of a fingernail initially. Hasn't ever treated with topical treatments. 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 No change in voice since the diagnosis. No genesis GERD symptoms. No globus sensation. Feels she bloats easily after eating. No genesis constipation. Never bloody stool. Mother has been given multiple autoimmune diagnoses including Crohn's disease, ankylosing spondylitis, and rheumatoid arthritis. 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 Hands and feet always feel cold in the winter, though don't appreciably change color in the cold; does get painful flushing and erythema with hot water or rewarming. While doing gymnastics she felt she would get disproportionately dyspneic. Went to pulmonologist and completed exercise testing, doesn't recall the results but no medications started for this. No chronic cough. She gets pain from low back down. Hips. Feels deep constant ache throughout legs. No extended morning stiffness. \"Feels like a deep muscle ache. \"    Feels sun sensitive, gets \"splotchy\" while she is in the sun, fades over an hour or two. When younger would look more like hives.       REVIEW OF SYSTEMS  Negatives as follows:  Jennifer Paulino:    Denies unexplained persistent fevers, weight change, chronic fatigue  HEAD/EYES:              Denies eye redness, blurry vision or sudden loss of vision, dry eyes, HA, temporal pain  ENT:                            Denies oral/nasal ulcers, recurrent sinus infections, dry mouth  RESPIRATORY:         No pleuritic pain, history of pleural effusions, hemoptysis, exertional dyspnea  CARDIOVASCULAR:             Denies chest pain, history of pericardial effusions  GASTRO:                    Denies heartburn, esophageal dysmotility, abdominal pain, nausea, vomiting, diarrhea, blood in the stool  HEMATOLOGIC:        No easy bruising, purpura, swollen lymph nodes  SKIN:                           Denies alopecia, ulcers, nodules, sun sensitivity, unexplained persistent rash   VASCULAR:                Denies edema, cyanosis, raynaud phenomenon  NEUROLOGIC:           Denies specific muscle weakness, paresthesias   PSYCHIATRIC:           No sleep disturbance / snoring, depression, anxiety  MSK:                           No morning stiffness >=1 hour, SI joint pain, persistent joint swelling, persistent joint pain    Review of systems is as above and is otherwise negative. ALLERGIES  Amoxicillin and Amoxicillin    MEDICATIONS  None current    PAST MEDICAL HISTORY  Significant only for morphea as per HPI    FAMILY HISTORY  family history includes Arthritis-rheumatoid in her mother; Crohn's Disease in her mother; Thyroid Disease in her mother. Mother has had iritis in the past. Younger brother has vitiligo. SOCIAL HISTORY  She  reports that she has never smoked. She has never used smokeless tobacco. She reports that she does not currently use alcohol. She reports that she does not currently use drugs. 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 Eyes: Sclera are anicteric. No conjunctival injection. HEENT:  Oropharynx is clear. No oral ulcers. Adequate salivary pooling. No cervical or supraclavicular lymphadenopathy. Lungs:  Clear to auscultation bilaterally, without wheeze or stridor.  Normal respiratory effort. Cor:  Regular rate and rhythm. No murmur rub or gallop. Abdomen: Soft, non-tender, without hepatomegaly or masses. Extremities: No calf tenderness or edema. Warm and well perfused. Skin: Interval improved truncal rashes. Neuro: No foot or wrist drop. Normal gait. Musculoskeletal:    A comprehensive musculoskeletal exam was performed for all joints of each upper and lower extremity and assessed for swelling, tenderness and range of motion. Results are documented as below:  No evidence of synovitis in the small joints of the hands, wrists, shoulders, elbows, hips, knees or ankles. Labs:  4/20/22: ESR 2, Cr 0.87, Tbili <0.2, AST 18, ALT 19, Alk phos 136, CBC WNL, CRP 3 mg/L  11/29/21: WBC 13.6, Hgb 11.7, Plt 220;   6/1/21: RNA pol3 neg, [id number], SSA and SSB neg  11/20/20: CBC WNL, CMP WNL (Cr 0.82), LOURDES 1:160 homogenous, ESR 9, CRP 2mg/L  11/3/15: CBC WNL  12/8/08: Tbili 0.4, AST 22, ALT 14, AlkP 259. Imaging:  None available      ASSESSMENT AND PLAN  Ms. Gita Robles is a 27 y.o. female who presents for follow up of morphea with acute global increase in pain most likely 2/2 viral infection. No e/o systemic sclerosis clinically, morphea improved in interim. 1. Morphea  - Cont Plaquenil (hydroxychloroquine) daily  - LYME DISEASE TOTAL ANTIBODY WITH REFLEX TO IMMUNOASSAY; Future  - TSH REFLEX TO T4; Future    2. Occipital headache  - LYME DISEASE TOTAL ANTIBODY WITH REFLEX TO IMMUNOASSAY; Future    3. Meningismus  - C REACTIVE PROTEIN, QT; Future  - CBC WITH AUTOMATED DIFF; Future  - METABOLIC PANEL, COMPREHENSIVE; Future  - SED RATE (ESR); Future  - LYME DISEASE TOTAL ANTIBODY WITH REFLEX TO IMMUNOASSAY; Future  - TSH REFLEX TO T4; Future    4. Long-term use of hydroxychloroquine  - CBC WITH AUTOMATED DIFF; Future  - METABOLIC PANEL, COMPREHENSIVE; Future    Patient Instructions   1) Take Tylenol (1,000mg up to 3X daily).  I think that this is a virus and that your pain will subside in the next couple of weeks. 2) If you start to notice any blurred vision, slurred speech, fevers or any other weird symptoms then go to the ER. 3) Send me a message next Friday and let me know if you are feeling better or not. 4) Check labs ASAP. 5) Follow up in 3-4 months. Let me know if you have any questions or concerns in the meantime. Face to face time: 15 minutes  Note preparation and records review day of service:  20 minutes  Total provider time day of service: 35 minutes    This was scribed by Dre Rashid in the presence of Dr. Bren Brown.      Jailyn Ríos MD    Adult Rheumatology    [facility]  A Part of [facility], [address] [place] [address]   Phone [phone]  Fax [phone]  t

## 2022-09-02 NOTE — PATIENT INSTRUCTIONS
1) Take Tylenol (1,000mg up to 3X daily). I think that this is a virus and that your pain will subside in the next couple of weeks. 2) If you start to notice any blurred vision, slurred speech, fevers or any other weird symptoms then go to the ER. 3) Send me a message next Friday and let me know if you are feeling better or not. 4) Check labs ASAP. 5) Follow up in 3-4 months. Let me know if you have any questions or concerns in the meantime.

## 2022-09-02 NOTE — PROGRESS NOTES
Chief Complaint   Patient presents with    Other     Morphea    Joint Pain     Neck, back, hip     1. Have you been to the ER, urgent care clinic since your last visit? Hospitalized since your last visit? No    2. Have you seen or consulted any other health care providers outside of the [address] [place] [address] since your last visit? Include any pap smears or colon screening.  Yes Where: gyn

## 2022-09-07 LAB
ALBUMIN SERPL-MCNC: 4.6 G/DL (ref 3.9–5)
ALBUMIN/GLOB SERPL: 1.8 {RATIO} (ref 1.2–2.2)
ALP SERPL-CCNC: 139 IU/L (ref 44–121)
ALT SERPL-CCNC: 48 IU/L (ref 0–32)
AST SERPL-CCNC: 31 IU/L (ref 0–40)
BASOPHILS # BLD AUTO: 0.1 X10E3/UL (ref 0–0.2)
BASOPHILS NFR BLD AUTO: 1 %
BILIRUB SERPL-MCNC: 0.2 MG/DL (ref 0–1.2)
BUN SERPL-MCNC: 13 MG/DL (ref 6–20)
BUN/CREAT SERPL: 15 (ref 9–23)
CALCIUM SERPL-MCNC: 9.2 MG/DL (ref 8.7–10.2)
CHLORIDE SERPL-SCNC: 103 MMOL/L (ref 96–106)
CO2 SERPL-SCNC: 24 MMOL/L (ref 20–29)
CREAT SERPL-MCNC: 0.88 MG/DL (ref 0.57–1)
CRP SERPL-MCNC: 7 MG/L (ref 0–10)
EGFR: 92 ML/MIN/1.73
EOSINOPHIL # BLD AUTO: 0 X10E3/UL (ref 0–0.4)
EOSINOPHIL NFR BLD AUTO: 0 %
ERYTHROCYTE [DISTWIDTH] IN BLOOD BY AUTOMATED COUNT: 11.4 % (ref 11.7–15.4)
ERYTHROCYTE [SEDIMENTATION RATE] IN BLOOD BY WESTERGREN METHOD: 9 MM/HR (ref 0–32)
GLOBULIN SER CALC-MCNC: 2.5 G/DL (ref 1.5–4.5)
GLUCOSE SERPL-MCNC: 94 MG/DL (ref 65–99)
HCT VFR BLD AUTO: 40.5 % (ref 34–46.6)
HGB BLD-MCNC: 14.1 G/DL (ref 11.1–15.9)
IMM GRANULOCYTES # BLD AUTO: 0 X10E3/UL (ref 0–0.1)
IMM GRANULOCYTES NFR BLD AUTO: 1 %
LYME TOTAL ANTIBODY EIA: NEGATIVE
LYMPHOCYTES # BLD AUTO: 1.8 X10E3/UL (ref 0.7–3.1)
LYMPHOCYTES NFR BLD AUTO: 28 %
MCH RBC QN AUTO: 30.5 PG (ref 26.6–33)
MCHC RBC AUTO-ENTMCNC: 34.8 G/DL (ref 31.5–35.7)
MCV RBC AUTO: 88 FL (ref 79–97)
MONOCYTES # BLD AUTO: 0.8 X10E3/UL (ref 0.1–0.9)
MONOCYTES NFR BLD AUTO: 12 %
NEUTROPHILS # BLD AUTO: 3.8 X10E3/UL (ref 1.4–7)
NEUTROPHILS NFR BLD AUTO: 58 %
PLATELET # BLD AUTO: 242 X10E3/UL (ref 150–450)
POTASSIUM SERPL-SCNC: 3.8 MMOL/L (ref 3.5–5.2)
PROT SERPL-MCNC: 7.1 G/DL (ref 6–8.5)
RBC # BLD AUTO: 4.62 X10E6/UL (ref 3.77–5.28)
SODIUM SERPL-SCNC: 142 MMOL/L (ref 134–144)
TSH SERPL DL<=0.005 MIU/L-ACNC: 0.84 UIU/ML (ref 0.45–4.5)
WBC # BLD AUTO: 6.5 X10E3/UL (ref 3.4–10.8)

## 2022-09-29 ENCOUNTER — PATIENT MESSAGE (OUTPATIENT)
Dept: RHEUMATOLOGY | Age: 27
End: 2022-09-29

## 2022-09-29 DIAGNOSIS — R74.8 INCREASED LIVER ENZYMES: ICD-10-CM

## 2022-09-29 DIAGNOSIS — R74.8 BLOOD ALKALINE PHOSPHATASE INCREASED COMPARED WITH PRIOR MEASUREMENT: ICD-10-CM

## 2022-09-29 DIAGNOSIS — L94.0 MORPHEA: Primary | ICD-10-CM

## 2022-09-30 NOTE — TELEPHONE ENCOUNTER
From: Devyn Vargas  To: Sarahi Riddle MD  Sent: 9/29/2022 4:17 PM EDT  Subject: Most recent lab results    Hi Dr. Marilee Woodall,    I was looking over my labs that you ordered when I saw you earlier this month and some of the levels have increased quite a bit since they were done last time. I'm not really sure what the tests mean but since I haven't heard from you I'm assuming it's nothing to worry about, but just wanted to double check. The ones I noticed were c reactive protein, alt (sgpt) and alk phosphatase. I think those were the only ones that have increased.

## 2022-10-25 LAB
ALBUMIN SERPL-MCNC: 4.8 G/DL (ref 3.9–5)
ALP BONE SERPL-MCNC: 33.8 UG/L
ALP SERPL-CCNC: 107 IU/L (ref 44–121)
ALT SERPL-CCNC: 10 IU/L (ref 0–32)
AST SERPL-CCNC: 14 IU/L (ref 0–40)
BILIRUB DIRECT SERPL-MCNC: 0.14 MG/DL (ref 0–0.4)
BILIRUB SERPL-MCNC: 0.6 MG/DL (ref 0–1.2)
GGT SERPL-CCNC: 8 IU/L (ref 0–60)
PROT SERPL-MCNC: 7.1 G/DL (ref 6–8.5)

## 2022-12-05 ENCOUNTER — OFFICE VISIT (OUTPATIENT)
Dept: RHEUMATOLOGY | Age: 27
End: 2022-12-05
Payer: COMMERCIAL

## 2022-12-05 VITALS
BODY MASS INDEX: 26.09 KG/M2 | WEIGHT: 152 LBS | OXYGEN SATURATION: 98 % | SYSTOLIC BLOOD PRESSURE: 122 MMHG | RESPIRATION RATE: 16 BRPM | HEART RATE: 87 BPM | DIASTOLIC BLOOD PRESSURE: 83 MMHG | TEMPERATURE: 98 F

## 2022-12-05 DIAGNOSIS — Z79.899 LONG-TERM USE OF HYDROXYCHLOROQUINE: ICD-10-CM

## 2022-12-05 DIAGNOSIS — L94.0 MORPHEA: Primary | ICD-10-CM

## 2022-12-05 DIAGNOSIS — G43.909 MIGRAINE WITHOUT STATUS MIGRAINOSUS, NOT INTRACTABLE, UNSPECIFIED MIGRAINE TYPE: ICD-10-CM

## 2022-12-05 PROCEDURE — 99214 OFFICE O/P EST MOD 30 MIN: CPT | Performed by: INTERNAL MEDICINE

## 2022-12-05 RX ORDER — BUTALBITAL, ACETAMINOPHEN AND CAFFEINE 50; 325; 40 MG/1; MG/1; MG/1
50 TABLET ORAL
COMMUNITY
Start: 2022-09-04

## 2022-12-05 NOTE — PROGRESS NOTES
Chief Complaint   Patient presents with    Joint Pain     1. Have you been to the ER, urgent care clinic since your last visit? Hospitalized since your last visit? ER in September d/t pain from last visit. Result was migraine. Not admitted. [facility]     2. Have you seen or consulted any other health care providers outside of the [address] [place] [address] since your last visit? Include any pap smears or colon screening.  No

## 2022-12-05 NOTE — PROGRESS NOTES
REASON FOR VISIT:   Chapo Lemons is a 32 y.o. female with history of morphea who is returning for a followup. HISTORY OF PRESENT ILLNESS    Pt returns for a follow up. Pt takes 2 pills of Plaquenil daily with good tolerance. She is not using tacro cream. She has taken Motrin about two times for headaches since her LV. She last saw the eye doctor in May or June with no complications. Pt says that after LV her headaches worsened, and she had to go tot he ER. She was told that it was a migraine and given IV fluids. She notes that she has had about 4 migraines since this time. She was put on Butalbital for her headaches, which she has used a few times. All of her headaches are within 2 hours before going to sleep or in the middle of the night. They have gotten so severe that she has become nauseous and had to throw up at night. She has never seen a neurologist for her headaches. Pt says that she has not had any flare ups of body aches since her LV. Pt denies new or worsening skin rashes. Pt reports that her mother also developed headaches postpartum. Disease History:  Diagnosed in 10th grade with scleroderma, after punch biopsies of trunk lesions by two dermatologists. Referred to Rheumatology who diagnosed localized scleroderma (morphea). Treated with IV methylprednisolone infusions monthly as well as weekly subQ methotrexate. Estimates she has been on subQ methotrexate for 6 of the last 12 years. Last treatment June 2017. Was extremely nauseous with methotrexate. Switched to mycophenolate (1000mg bid) which she tolerated better with only minor sleep disturbance. Took this for 7-8 months, stopped around mid-2018. After about a year and half off of treatment, then in May started noticing new lesions, now about the size of a tennis ball from the size of a fingernail initially. Hasn't ever treated with topical treatments. 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  No change in voice since the diagnosis. No genesis GERD symptoms. No globus sensation. Feels she bloats easily after eating. No genesis constipation. Never bloody stool. Mother has been given multiple autoimmune diagnoses including Crohn's disease, ankylosing spondylitis, and rheumatoid arthritis. 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 Hands and feet always feel cold in the winter, though don't appreciably change color in the cold; does get painful flushing and erythema with hot water or rewarming. While doing gymnastics she felt she would get disproportionately dyspneic. Went to pulmonologist and completed exercise testing, doesn't recall the results but no medications started for this. No chronic cough. She gets pain from low back down. Hips. Feels deep constant ache throughout legs. No extended morning stiffness. \"Feels like a deep muscle ache. \"    Feels sun sensitive, gets \"splotchy\" while she is in the sun, fades over an hour or two. When younger would look more like hives.       REVIEW OF SYSTEMS  Negatives as follows:  Gail Cardinal:    Denies unexplained persistent fevers, weight change, chronic fatigue  HEAD/EYES:              Denies eye redness, blurry vision or sudden loss of vision, dry eyes, HA, temporal pain  ENT:                            Denies oral/nasal ulcers, recurrent sinus infections, dry mouth  RESPIRATORY:         No pleuritic pain, history of pleural effusions, hemoptysis, exertional dyspnea  CARDIOVASCULAR:             Denies chest pain, history of pericardial effusions  GASTRO:                    Denies heartburn, esophageal dysmotility, abdominal pain, nausea, vomiting, diarrhea, blood in the stool  HEMATOLOGIC:        No easy bruising, purpura, swollen lymph nodes  SKIN:                           Denies alopecia, ulcers, nodules, sun sensitivity, unexplained persistent rash   VASCULAR: Denies edema, cyanosis, raynaud phenomenon  NEUROLOGIC:           Denies specific muscle weakness, paresthesias   PSYCHIATRIC:           No sleep disturbance / snoring, depression, anxiety  MSK:                           No morning stiffness >=1 hour, SI joint pain, persistent joint swelling, persistent joint pain    Review of systems is as above and is otherwise negative. ALLERGIES  Amoxicillin and Amoxicillin    MEDICATIONS  None current    PAST MEDICAL HISTORY  Significant only for morphea as per HPI    FAMILY HISTORY  family history includes Arthritis-rheumatoid in her mother; Crohn's Disease in her mother; Thyroid Disease in her mother. Mother has had iritis in the past. Younger brother has vitiligo. SOCIAL HISTORY  She  reports that she has never smoked. She has never used smokeless tobacco. She reports that she does not currently use alcohol. She reports that she does not currently use drugs. 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 DATA  Visit Vitals  /83 (BP 1 Location: Left upper arm, BP Patient Position: Sitting, BP Cuff Size: Adult)   Pulse 87   Temp 98 °F (36.7 °C) (Oral)   Resp 16   Wt 152 lb (68.9 kg)   LMP 11/28/2022 (Approximate)   SpO2 98%   BMI 26.09 kg/m²       General:  The patient is well developed, well nourished, alert, and in no apparent distress. Eyes: Sclera are anicteric. No conjunctival injection. HEENT:  Oropharynx is clear. No oral ulcers. Adequate salivary pooling. No cervical or supraclavicular lymphadenopathy. Lungs: Normal respiratory effort. Cor:  Regular rate and rhythm. Abdomen: Soft, non-tender  Extremities: No calf tenderness or edema. Warm and well perfused. Skin: No new truncal rashes. Neuro: No foot or wrist drop. Normal gait.   Musculoskeletal:    A comprehensive musculoskeletal exam was performed for all joints of each upper and lower extremity and assessed for swelling, tenderness and range of motion. Results are documented as below:  No evidence of synovitis in the small joints of the hands, wrists, shoulders, elbows, hips, knees or ankles. Labs:  10/3/22: Tandem R Ostase 33.8, GGT 8, LFT WNL  9/3/22: TSH 0.839, Lyme Ab total by EIA neg, ESR 9, Cr 0.88, Tbili 0.2, AST 31, Alk phos 139, ALT 48, CBC WNL, CRP 7 mg/L  4/20/22: ESR 2, Cr 0.87, Tbili <0.2, AST 18, ALT 19, Alk phos 136, CBC WNL, CRP 3 mg/L  11/29/21: WBC 13.6, Hgb 11.7, Plt 220;   6/1/21: RNA pol3 neg, [id number], SSA and SSB neg  11/20/20: CBC WNL, CMP WNL (Cr 0.82), LOURDES 1:160 homogenous, ESR 9, CRP 2mg/L  11/3/15: CBC WNL  12/8/08: Tbili 0.4, AST 22, ALT 14, AlkP 259. Imaging:  None available      ASSESSMENT AND PLAN  Ms. Sarah Felix is a 32 y.o. female who presents for follow up of morphea with recent onset of periodic severe headaches suspicious for migraines. Pt does not have a general PCP, referring to Neurology for assistance with management. 1. Morphea  - REFERRAL TO NEUROLOGY  - Cont Plaquenil 400mg/day  - Cont topical tacrolimus cream as needed    2. Long-term use of hydroxychloroquine  - Cont at least annual ophthalmology f/u  - REFERRAL TO NEUROLOGY    3. Migraine without status migrainosus, not intractable, unspecified migraine type  - REFERRAL TO NEUROLOGY    Patient Instructions   1) Continue 2 pills of plaquenil per day. Make sure you see an ophthalmologist once per year as long as you are on this medicine. The chances are 1 in 5000 after 5 years that you could develop visual changes. 2) You can take 650mg of Tylenol up to 3 times a day for joint pain. 3) I am referring you to neurology. You can take this to any neurologist that takes your insurance and is convenient for you or you can call the number on the printout to schedule with Dr. Keily Cortez at [address] [place] [address]. 4) Use your Tacro cream if you develop new skin lesions. Let me know if you have to use this.      5) Follow up in 6 months. Let me know if you have any questions or concerns in the meantime. Face to face time: 20 minutes  Note preparation and records review day of service:  10 minutes  Total provider time day of service: 30 minutes    This was scribed by Amadeo Stevens in the presence of Dr. Yoseph Villeda. The note was reviewed and amended personally, and I agree with the above information.     Agustina Sosa MD    Adult Rheumatology   [facility], [address] [place] [address]   Phone [phone]  Fax [phone]

## 2022-12-05 NOTE — PATIENT INSTRUCTIONS
1) Continue 2 pills of plaquenil per day. Make sure you see an ophthalmologist once per year as long as you are on this medicine. The chances are 1 in 5000 after 5 years that you could develop visual changes. 2) You can take 650mg of Tylenol up to 3 times a day for joint pain. 3) I am referring you to neurology. You can take this to any neurologist that takes your insurance and is convenient for you or you can call the number on the printout to schedule with Dr. Socorro Apley at [facility]. 4) Use your Tacro cream if you develop new skin lesions. Let me know if you have to use this. 5) Follow up in 6 months. Let me know if you have any questions or concerns in the meantime.

## 2023-06-05 ENCOUNTER — NURSE ONLY (OUTPATIENT)
Age: 28
End: 2023-06-05

## 2023-06-05 ENCOUNTER — OFFICE VISIT (OUTPATIENT)
Age: 28
End: 2023-06-05
Payer: COMMERCIAL

## 2023-06-05 VITALS
HEART RATE: 71 BPM | RESPIRATION RATE: 16 BRPM | TEMPERATURE: 98.6 F | DIASTOLIC BLOOD PRESSURE: 80 MMHG | WEIGHT: 161 LBS | OXYGEN SATURATION: 98 % | BODY MASS INDEX: 27.64 KG/M2 | SYSTOLIC BLOOD PRESSURE: 136 MMHG

## 2023-06-05 DIAGNOSIS — Z79.899 LONG-TERM USE OF HYDROXYCHLOROQUINE: ICD-10-CM

## 2023-06-05 DIAGNOSIS — R76.8 ANA POSITIVE: ICD-10-CM

## 2023-06-05 DIAGNOSIS — L94.0 LOCALIZED SCLERODERMA (MORPHEA): ICD-10-CM

## 2023-06-05 DIAGNOSIS — L94.0 LOCALIZED SCLERODERMA (MORPHEA): Primary | ICD-10-CM

## 2023-06-05 LAB
ALBUMIN SERPL-MCNC: 3.9 G/DL (ref 3.5–5)
ALBUMIN/GLOB SERPL: 1.3 (ref 1.1–2.2)
ALP SERPL-CCNC: 74 U/L (ref 45–117)
ALT SERPL-CCNC: 16 U/L (ref 12–78)
ANION GAP SERPL CALC-SCNC: 4 MMOL/L (ref 5–15)
AST SERPL-CCNC: 13 U/L (ref 15–37)
BASOPHILS # BLD: 0 K/UL (ref 0–0.1)
BASOPHILS NFR BLD: 1 % (ref 0–1)
BILIRUB SERPL-MCNC: 0.4 MG/DL (ref 0.2–1)
BUN SERPL-MCNC: 13 MG/DL (ref 6–20)
BUN/CREAT SERPL: 16 (ref 12–20)
CALCIUM SERPL-MCNC: 9.3 MG/DL (ref 8.5–10.1)
CHLORIDE SERPL-SCNC: 107 MMOL/L (ref 97–108)
CO2 SERPL-SCNC: 28 MMOL/L (ref 21–32)
CREAT SERPL-MCNC: 0.8 MG/DL (ref 0.55–1.02)
CRP SERPL-MCNC: 0.31 MG/DL (ref 0–0.6)
DIFFERENTIAL METHOD BLD: NORMAL
EOSINOPHIL # BLD: 0.1 K/UL (ref 0–0.4)
EOSINOPHIL NFR BLD: 1 % (ref 0–7)
ERYTHROCYTE [DISTWIDTH] IN BLOOD BY AUTOMATED COUNT: 12.2 % (ref 11.5–14.5)
ERYTHROCYTE [SEDIMENTATION RATE] IN BLOOD: 8 MM/HR (ref 0–20)
GLOBULIN SER CALC-MCNC: 3.1 G/DL (ref 2–4)
GLUCOSE SERPL-MCNC: 92 MG/DL (ref 65–100)
HCT VFR BLD AUTO: 41.1 % (ref 35–47)
HGB BLD-MCNC: 12.8 G/DL (ref 11.5–16)
IMM GRANULOCYTES # BLD AUTO: 0 K/UL (ref 0–0.04)
IMM GRANULOCYTES NFR BLD AUTO: 0 % (ref 0–0.5)
LYMPHOCYTES # BLD: 2 K/UL (ref 0.8–3.5)
LYMPHOCYTES NFR BLD: 30 % (ref 12–49)
MCH RBC QN AUTO: 29.6 PG (ref 26–34)
MCHC RBC AUTO-ENTMCNC: 31.1 G/DL (ref 30–36.5)
MCV RBC AUTO: 95.1 FL (ref 80–99)
MONOCYTES # BLD: 0.5 K/UL (ref 0–1)
MONOCYTES NFR BLD: 8 % (ref 5–13)
NEUTS SEG # BLD: 4 K/UL (ref 1.8–8)
NEUTS SEG NFR BLD: 60 % (ref 32–75)
NRBC # BLD: 0 K/UL (ref 0–0.01)
NRBC BLD-RTO: 0 PER 100 WBC
PLATELET # BLD AUTO: 249 K/UL (ref 150–400)
PMV BLD AUTO: 11.1 FL (ref 8.9–12.9)
POTASSIUM SERPL-SCNC: 4.8 MMOL/L (ref 3.5–5.1)
PROT SERPL-MCNC: 7 G/DL (ref 6.4–8.2)
RBC # BLD AUTO: 4.32 M/UL (ref 3.8–5.2)
SODIUM SERPL-SCNC: 139 MMOL/L (ref 136–145)
WBC # BLD AUTO: 6.6 K/UL (ref 3.6–11)

## 2023-06-05 PROCEDURE — 99214 OFFICE O/P EST MOD 30 MIN: CPT | Performed by: INTERNAL MEDICINE

## 2023-06-05 PROCEDURE — G8419 CALC BMI OUT NRM PARAM NOF/U: HCPCS | Performed by: INTERNAL MEDICINE

## 2023-06-05 PROCEDURE — G8427 DOCREV CUR MEDS BY ELIG CLIN: HCPCS | Performed by: INTERNAL MEDICINE

## 2023-06-05 PROCEDURE — 1036F TOBACCO NON-USER: CPT | Performed by: INTERNAL MEDICINE

## 2023-06-05 ASSESSMENT — PATIENT HEALTH QUESTIONNAIRE - PHQ9
SUM OF ALL RESPONSES TO PHQ9 QUESTIONS 1 & 2: 0
SUM OF ALL RESPONSES TO PHQ QUESTIONS 1-9: 0
1. LITTLE INTEREST OR PLEASURE IN DOING THINGS: 0
2. FEELING DOWN, DEPRESSED OR HOPELESS: 0
SUM OF ALL RESPONSES TO PHQ QUESTIONS 1-9: 0

## 2023-06-05 NOTE — PROGRESS NOTES
1. Have you been to the ER, urgent care clinic since your last visit? Hospitalized since your last visit? No    2. Have you seen or consulted any other health care providers outside of the [address] [place] [address] since your last visit? Include any pap smears or colon screening.   Yes OB GYN
summer  - JOSE Comprehensive Plus Panel; Future  - C-Reactive Protein; Future  - Sedimentation Rate; Future  - CBC with Auto Differential; Future  - Comprehensive Metabolic Panel; Future    3. JOSE positive  - JOSE Comprehensive Plus Panel; Future      Patient Instructions   Continue Plaquenil 400mg/day for now. Reach out to your eye doctor for updated eye screening  Labs on your way out today. Call the rheumatology practices listed in our letter to schedule with a new provider ASAP. If you continue to do well, you may be able to start a Plaquenil wean. . I'd recommend holding off until you get established with your next provider however.       Face to face time: 22 minutes  Note preparation and records review day of service:  15 minutes  Total provider time day of service: 37 minutes    Dariel Villa MD    Adult Rheumatology   [address], [address] [place] [address]   Phone [phone]  Fax [phone]

## 2023-06-06 LAB
CENTROMERE B AB SER-ACNC: <0.2 AI (ref 0–0.9)
CHROMATIN AB SERPL-ACNC: <0.2 AI (ref 0–0.9)
DSDNA AB SER-ACNC: 2 IU/ML (ref 0–9)
ENA JO1 AB SER-ACNC: <0.2 AI (ref 0–0.9)
ENA RNP AB SER-ACNC: <0.2 AI (ref 0–0.9)
ENA SCL70 AB SER-ACNC: <0.2 AI (ref 0–0.9)
ENA SM AB SER-ACNC: <0.2 AI (ref 0–0.9)
ENA SM+RNP AB SER-ACNC: <0.2 AI (ref 0–0.9)
ENA SS-A AB SER-ACNC: <0.2 AI (ref 0–0.9)
ENA SS-B AB SER-ACNC: <0.2 AI (ref 0–0.9)
RIBOSOMAL P AB SER-ACNC: <0.2 AI (ref 0–0.9)
SEE BELOW:, 164879: NORMAL